# Patient Record
Sex: MALE | Race: WHITE | Employment: FULL TIME | URBAN - METROPOLITAN AREA
[De-identification: names, ages, dates, MRNs, and addresses within clinical notes are randomized per-mention and may not be internally consistent; named-entity substitution may affect disease eponyms.]

---

## 2020-07-24 ENCOUNTER — APPOINTMENT (OUTPATIENT)
Dept: CT IMAGING | Age: 65
End: 2020-07-24
Payer: MEDICARE

## 2020-07-24 ENCOUNTER — HOSPITAL ENCOUNTER (EMERGENCY)
Age: 65
Discharge: HOME | End: 2020-07-24
Payer: MEDICARE

## 2020-07-24 VITALS
BODY MASS INDEX: 28 KG/M2 | OXYGEN SATURATION: 100 % | SYSTOLIC BLOOD PRESSURE: 147 MMHG | HEART RATE: 70 BPM | WEIGHT: 200 LBS | TEMPERATURE: 98.4 F | RESPIRATION RATE: 12 BRPM | DIASTOLIC BLOOD PRESSURE: 79 MMHG | HEIGHT: 71 IN

## 2020-07-24 PROCEDURE — 70450 CT HEAD/BRAIN W/O DYE: CPT

## 2020-07-24 PROCEDURE — 6370000000 HC RX 637 (ALT 250 FOR IP): Performed by: PHYSICIAN ASSISTANT

## 2020-07-24 PROCEDURE — 99284 EMERGENCY DEPT VISIT MOD MDM: CPT

## 2020-07-24 PROCEDURE — 72125 CT NECK SPINE W/O DYE: CPT

## 2020-07-24 RX ORDER — ACETAMINOPHEN 500 MG
1000 TABLET ORAL ONCE
Status: COMPLETED | OUTPATIENT
Start: 2020-07-24 | End: 2020-07-24

## 2020-07-24 RX ADMIN — ACETAMINOPHEN 1000 MG: 500 TABLET ORAL at 23:45

## 2020-07-24 ASSESSMENT — ENCOUNTER SYMPTOMS
VOMITING: 0
ABDOMINAL PAIN: 0
NAUSEA: 0
BACK PAIN: 0
ABDOMINAL DISTENTION: 0

## 2020-07-24 ASSESSMENT — PAIN SCALES - GENERAL
PAINLEVEL_OUTOF10: 3
PAINLEVEL_OUTOF10: 2

## 2020-07-25 ASSESSMENT — VISUAL ACUITY: OU: 1

## 2020-07-25 NOTE — ED TRIAGE NOTES
Pt reports he and his wife were outside working together and went in to the shower together and slipped while they were in the shower. Pt hit head and reports headache. Pt denies any LOC no blood thinners. No nausea.  Pt is A&OX4

## 2020-07-25 NOTE — ED PROVIDER NOTES
stiffness. Negative for back pain. Neurological: Positive for headaches. Negative for dizziness, syncope, facial asymmetry, weakness, light-headedness and numbness. Positives and Pertinent negatives as per HPI. Except as noted abovein the ROS, all other systems were reviewed and negative. PAST MEDICAL HISTORY     Past Medical History:   Diagnosis Date    Clostridium difficile infection 5/18/16, 4/28/16    Diverticulitis     Hyperlipidemia     Reflux     Seasonal allergies          SURGICAL HISTORY     Past Surgical History:   Procedure Laterality Date    APPENDECTOMY      COLONOSCOPY  2005    hx of polyps    COLONOSCOPY  06/28/2016    HERNIA REPAIR      THYROID LOBECTOMY  12/20/12    left and isthmusectomy         CURRENTMEDICATIONS       Previous Medications    ASCORBIC ACID (VITAMIN C) 500 MG TABLET    Take 2,000 mg by mouth daily    AZELASTINE-FLUTICASONE 137-50 MCG/ACT SUSP    1 spray by Nasal route 2 times daily    CETIRIZINE (ZYRTEC) 10 MG TABLET    Take 10 mg by mouth daily. HYDROCODONE-ACETAMINOPHEN (NORCO) 5-325 MG PER TABLET    Take 1-2 tablets by mouth every 4 hours as needed for Pain    MULTIVITAMIN (THERAGRAN) PER TABLET    Take 1 tablet by mouth daily. OMEPRAZOLE (PRILOSEC) 20 MG CAPSULE    Take 20 mg by mouth daily. ONDANSETRON (ZOFRAN ODT) 4 MG DISINTEGRATING TABLET    Take 1 tablet by mouth every 8 hours as needed for Nausea    SIMVASTATIN (ZOCOR) 20 MG TABLET    Take 20 mg by mouth nightly. VITAMIN B-12 (CYANOCOBALAMIN) 1000 MCG TABLET    Take 1,000 mcg by mouth daily    VITAMIN D (CHOLECALCIFEROL) 400 UNITS TABS TABLET    Take 400 Units by mouth daily         ALLERGIES     Patient has no known allergies.     FAMILYHISTORY       Family History   Problem Relation Age of Onset    Diabetes Mother     Heart Disease Father     Diabetes Sister           SOCIAL HISTORY       Social History     Socioeconomic History    Marital status:      Spouse name: Not on file    Number of children: Not on file    Years of education: Not on file    Highest education level: Not on file   Occupational History    Not on file   Social Needs    Financial resource strain: Not on file    Food insecurity     Worry: Not on file     Inability: Not on file    Transportation needs     Medical: Not on file     Non-medical: Not on file   Tobacco Use    Smoking status: Never Smoker    Smokeless tobacco: Never Used   Substance and Sexual Activity    Alcohol use: No    Drug use: No    Sexual activity: Yes     Partners: Female   Lifestyle    Physical activity     Days per week: Not on file     Minutes per session: Not on file    Stress: Not on file   Relationships    Social connections     Talks on phone: Not on file     Gets together: Not on file     Attends Mandaeism service: Not on file     Active member of club or organization: Not on file     Attends meetings of clubs or organizations: Not on file     Relationship status: Not on file    Intimate partner violence     Fear of current or ex partner: Not on file     Emotionally abused: Not on file     Physically abused: Not on file     Forced sexual activity: Not on file   Other Topics Concern    Not on file   Social History Narrative    Not on file       SCREENINGS    [place] Coma Scale  Eye Opening: Spontaneous  Best Verbal Response: Oriented  Best Motor Response: Obeys commands  Aline Coma Scale Score: 15      [place] Criteria for Head CT Imaging  Imaging is indicated if any of the following are present:  GCS < 15 two hours after injury: No  Suspected open/depressed skull fracture: No  Signs of basilar skull fracture: No  Two or more episodes of vomiting since injury: No  Age 72 years or older: Yes  Amnesia of time before impact: No  Dangerous mechanism (pedestrian struck by motor vehicle, ejected from MVC, fall >3ft or >5 steps): No  Neurologic deficits on exam: No  Seizures after injury: No  Bleeding diathesis or anticoagulant use: No    Based on the [facility] HCT rules, head CT imaging is indicated for this patient. NEXUS Criteria for Cervical Spine Imaging  Posterior midline cervical spine tenderness on exam: Yes  Normal level of alertness: Yes  Evidence of intoxication: No  Abnormal neurologic findings on exam: No  Painful distracting injuries: No    Based on the NEXUS criteria, the cervical spine cannot be cleared, and imaging is indicated. PHYSICAL EXAM    (up to 7 for level 4, 8 or more for level 5)     ED Triage Vitals [07/24/20 2138]   BP Temp Temp src Pulse Resp SpO2 Height Weight   (!) 151/98 98.4 °F (36.9 °C) -- 92 20 100 % 5' 10.5\" (1.791 m) 200 lb (90.7 kg)       Physical Exam  Vitals signs and nursing note reviewed. Constitutional:       General: He is awake. He is not in acute distress. Appearance: Normal appearance. He is well-developed. He is not ill-appearing, toxic-appearing or diaphoretic. HENT:      Head: Normocephalic and atraumatic. No raccoon eyes, Sanches's sign, abrasion, contusion, right periorbital erythema or left periorbital erythema. Jaw: There is normal jaw occlusion. Right Ear: Hearing, tympanic membrane, ear canal and external ear normal. No hemotympanum. Left Ear: Hearing, tympanic membrane, ear canal and external ear normal. No hemotympanum. Nose: Nose normal. No nasal deformity, signs of injury or laceration. Right Nostril: No epistaxis or septal hematoma. Left Nostril: No epistaxis or septal hematoma. Mouth/Throat:      Lips: Pink. Mouth: Mucous membranes are moist. No injury. Pharynx: Oropharynx is clear. Uvula midline. Eyes:      General: Lids are normal. Vision grossly intact. Gaze aligned appropriately. No visual field deficit. Right eye: No discharge. Left eye: No discharge. Extraocular Movements: Extraocular movements intact. Right eye: No nystagmus. Left eye: No nystagmus. Conjunctiva/sclera: Conjunctivae normal.   Neck:      Musculoskeletal: Normal range of motion and neck supple. Normal range of motion. Injury, spinous process tenderness and muscular tenderness present. No edema, erythema, neck rigidity, crepitus or pain with movement. Trachea: Trachea and phonation normal.   Cardiovascular:      Rate and Rhythm: Normal rate and regular rhythm. Pulses:           Radial pulses are 2+ on the right side and 2+ on the left side. Posterior tibial pulses are 1+ on the right side and 1+ on the left side. Heart sounds: Normal heart sounds. No murmur. No friction rub. No gallop. Pulmonary:      Effort: Pulmonary effort is normal. No respiratory distress. Breath sounds: Normal breath sounds. No wheezing or rales. Chest:      Chest wall: No mass, lacerations, deformity, swelling, tenderness, crepitus or edema. Abdominal:      General: Abdomen is flat. Bowel sounds are normal. There is no distension or abdominal bruit. There are no signs of injury. Palpations: Abdomen is soft. Tenderness: There is no abdominal tenderness. Musculoskeletal:      Right hip: He exhibits normal strength. Left hip: He exhibits normal strength. Cervical back: He exhibits tenderness, bony tenderness and pain. Thoracic back: Normal.      Lumbar back: He exhibits no tenderness, no bony tenderness, no swelling, no edema, no deformity, no laceration, no pain and no spasm. Comments:  Strength 5/5, sensation intact- Motor strength 5/5 and symmetric to UE Deltoids/trapezius, biceps and wrist extensors. Sensation to light touch intact and symmetric to bilateral UE dermatomes. Strength 5/5 symmetric to LE hip flexors, knees and ankles. Sensation to light touch intact in bilateral LE dermatomes. 2+ DTR patella. Gait normal.      Skin:     General: Skin is warm and dry. Capillary Refill: Capillary refill takes less than 2 seconds.       Coloration: Skin is not pale.      Findings: No abrasion, bruising, ecchymosis, signs of injury or laceration. Neurological:      General: No focal deficit present. Mental Status: He is alert, oriented to person, place, and time and easily aroused. GCS: GCS eye subscore is 4. GCS verbal subscore is 5. GCS motor subscore is 6. Cranial Nerves: Cranial nerves are intact. No facial asymmetry. Sensory: Sensation is intact. Motor: Motor function is intact. No weakness, abnormal muscle tone or pronator drift. Coordination: Coordination is intact. Finger-Nose-Finger Test and Heel to [facility] Test normal. Rapid alternating movements normal.      Gait: Gait is intact. Deep Tendon Reflexes:      Reflex Scores:       Patellar reflexes are 2+ on the right side and 2+ on the left side. Psychiatric:         Attention and Perception: Attention and perception normal.         Behavior: Behavior normal. Behavior is cooperative. Cognition and Memory: Cognition and memory normal.           DIAGNOSTIC RESULTS   LABS:    Labs Reviewed - No data to display    All other labs were within normal range or not returned as of this dictation. EKG: All EKG's are interpreted by the Emergency Department Physician who either signs orCo-signs this chart in the absence of a cardiologist.  Please see their note for interpretation of EKG. RADIOLOGY:   Non-plain film images such as CT, Ultrasound and MRI are read by the radiologist. Plain radiographic images are visualized andpreliminarily interpreted by the  ED Provider with the below findings:        Interpretation perthe Radiologist below, if available at the time of this note:    CT CERVICAL SPINE WO CONTRAST   Final Result   No acute intracranial abnormality. No acute abnormality of the cervical spine. CT HEAD WO CONTRAST   Final Result   No acute intracranial abnormality. No acute abnormality of the cervical spine. No results found. PROCEDURES   Unless otherwise noted below, none     Procedures    CRITICAL CARE TIME   N/A    CONSULTS:  None      EMERGENCY DEPARTMENT COURSE and DIFFERENTIALDIAGNOSIS/MDM:   Vitals:    Vitals:    07/24/20 2138 07/24/20 2247 07/24/20 2348   BP: (!) 151/98 122/75 (!) 147/79   Pulse: 92 78 70   Resp: 20 12 12   Temp: 98.4 °F (36.9 °C)     SpO2: 100% 98% 100%   Weight: 200 lb (90.7 kg)     Height: 5' 10.5\" (1.791 m)         Patient was given thefollowing medications:  Medications   acetaminophen (TYLENOL) tablet 1,000 mg (1,000 mg Oral Given 7/24/20 2345)     Patient seen and evaluated. Old records reviewed. Diagnostic testing reviewed and results discussed. I have independently evaluated this patient based upon my scope of practice. Supervising physician was in the department for consultation as needed. Patient is a 51-year-old male who presents for evaluation after a fall. On exam he is alert oriented afebrile well-perfused hemodynamically stable nontoxic in appearance. He appears well overall, he does have some midline cervical spine tenderness. No other sign of gross trauma. His neuro exam is intact. CT cervical spine is negative for sign of acute abnormality of the spine, CT head is negative for sign of acute intracranial abnormality. Patient given acetaminophen in the emergency department and observed, he remained clinically stable. At this time I believe he is a reasonable candidate for discharge home with close follow-up with PCP and strict return precautions. . Based on patient's clinical history and clinical findings, and absence of peritonitis, sepsis, or toxicity I currently estimate there is low risk for: Intracranial hemorrhage, intra-abdominal injury, perforated bowel, subdural hematoma, AAA, TAA, cauda equina or central cord syndrome, compartment syndrome, epidural mass or lesion, herniated disc causing severe stenosis, tendon or NV injury, fracture or dislocation.  We have discussed the symptoms which are most concerning (e.g., bloody stool, fever, changing or worsening pain, vomiting) that necessitate immediate return. Pt is in agreement with the current plan and all questions were addressed. FINAL IMPRESSION      1. Fall from standing, initial encounter    2. Closed head injury, initial encounter    3. Strain of neck muscle, initial encounter    4.  Cervical disc disease          DISPOSITION/PLAN   DISPOSITION Decision To Discharge 07/24/2020 11:56:04 PM      PATIENT REFERREDTO:  Fabián Guadarrama  [address] [place] [address]  [phone]    Schedule an appointment as soon as possible for a visit   For a recheck in 5-7   days, sooner if no improvement or worsening of symptoms    [place] [facility]  [address] [facility]  Go to   If symptoms worsen      DISCHARGE MEDICATIONS:  New Prescriptions    No medications on file       DISCONTINUED MEDICATIONS:  Discontinued Medications    No medications on file              (Please note that portions ofthis note were completed with a voice recognition program.  Efforts were made to edit the dictations but occasionally words are mis-transcribed.)    Jody Disla (electronically signed)       Jody Disla  07/25/20 9017

## 2020-12-02 ENCOUNTER — HOSPITAL ENCOUNTER (OUTPATIENT)
Age: 65
Discharge: HOME | End: 2020-12-02
Payer: MEDICARE

## 2020-12-02 LAB
INR BLD: 0.92 (ref 0.86–1.14)
PROTHROMBIN TIME: 10.7 SEC (ref 10–13.2)

## 2020-12-02 PROCEDURE — 36415 COLL VENOUS BLD VENIPUNCTURE: CPT

## 2020-12-02 PROCEDURE — 85025 COMPLETE CBC W/AUTO DIFF WBC: CPT

## 2020-12-02 PROCEDURE — 85610 PROTHROMBIN TIME: CPT

## 2020-12-02 PROCEDURE — 82784 ASSAY IGA/IGD/IGG/IGM EACH: CPT

## 2020-12-02 PROCEDURE — 80053 COMPREHEN METABOLIC PANEL: CPT

## 2020-12-02 PROCEDURE — 83690 ASSAY OF LIPASE: CPT

## 2020-12-02 PROCEDURE — 83516 IMMUNOASSAY NONANTIBODY: CPT

## 2020-12-03 ENCOUNTER — HOSPITAL ENCOUNTER (OUTPATIENT)
Age: 65
Discharge: HOME | End: 2020-12-03
Payer: MEDICARE

## 2020-12-03 LAB
A/G RATIO: 2.1 (ref 1.1–2.2)
ALBUMIN SERPL-MCNC: 4.4 G/DL (ref 3.4–5)
ALP BLD-CCNC: 59 U/L (ref 40–129)
ALT SERPL-CCNC: 14 U/L (ref 10–40)
ANION GAP SERPL CALCULATED.3IONS-SCNC: 11 MMOL/L (ref 3–16)
AST SERPL-CCNC: 17 U/L (ref 15–37)
BASOPHILS ABSOLUTE: 0 K/UL (ref 0–0.2)
BASOPHILS RELATIVE PERCENT: 0.6 %
BILIRUB SERPL-MCNC: 0.3 MG/DL (ref 0–1)
BUN BLDV-MCNC: 19 MG/DL (ref 7–20)
C DIFF TOXIN/ANTIGEN: NORMAL
CALCIUM SERPL-MCNC: 8.8 MG/DL (ref 8.3–10.6)
CHLORIDE BLD-SCNC: 106 MMOL/L (ref 99–110)
CO2: 26 MMOL/L (ref 21–32)
CREAT SERPL-MCNC: 1 MG/DL (ref 0.8–1.3)
EOSINOPHILS ABSOLUTE: 0.1 K/UL (ref 0–0.6)
EOSINOPHILS RELATIVE PERCENT: 1.8 %
GFR AFRICAN AMERICAN: >60
GFR NON-AFRICAN AMERICAN: >60
GLOBULIN: 2.1 G/DL
GLUCOSE BLD-MCNC: 86 MG/DL (ref 70–99)
HCT VFR BLD CALC: 37.7 % (ref 40.5–52.5)
HEMOGLOBIN: 13 G/DL (ref 13.5–17.5)
IGA: 131 MG/DL (ref 70–400)
LIPASE: 28 U/L (ref 13–60)
LYMPHOCYTES ABSOLUTE: 2 K/UL (ref 1–5.1)
LYMPHOCYTES RELATIVE PERCENT: 36.4 %
MCH RBC QN AUTO: 30.7 PG (ref 26–34)
MCHC RBC AUTO-ENTMCNC: 34.4 G/DL (ref 31–36)
MCV RBC AUTO: 89.3 FL (ref 80–100)
MONOCYTES ABSOLUTE: 0.6 K/UL (ref 0–1.3)
MONOCYTES RELATIVE PERCENT: 10.3 %
NEUTROPHILS ABSOLUTE: 2.7 K/UL (ref 1.7–7.7)
NEUTROPHILS RELATIVE PERCENT: 50.9 %
PDW BLD-RTO: 13.3 % (ref 12.4–15.4)
PLATELET # BLD: 217 K/UL (ref 135–450)
PMV BLD AUTO: 8.9 FL (ref 5–10.5)
POTASSIUM SERPL-SCNC: 4.5 MMOL/L (ref 3.5–5.1)
RBC # BLD: 4.22 M/UL (ref 4.2–5.9)
SODIUM BLD-SCNC: 143 MMOL/L (ref 136–145)
TISSUE TRANSGLUTAMINASE IGA: <0.5 U/ML (ref 0–14)
TOTAL PROTEIN: 6.5 G/DL (ref 6.4–8.2)
WBC # BLD: 5.4 K/UL (ref 4–11)

## 2020-12-03 PROCEDURE — 87328 CRYPTOSPORIDIUM AG IA: CPT

## 2020-12-03 PROCEDURE — 87336 ENTAMOEB HIST DISPR AG IA: CPT

## 2020-12-03 PROCEDURE — 87506 IADNA-DNA/RNA PROBE TQ 6-11: CPT

## 2020-12-03 PROCEDURE — 83520 IMMUNOASSAY QUANT NOS NONAB: CPT

## 2020-12-03 PROCEDURE — 83993 ASSAY FOR CALPROTECTIN FECAL: CPT

## 2020-12-03 PROCEDURE — 82705 FATS/LIPIDS FECES QUAL: CPT

## 2020-12-03 PROCEDURE — 87324 CLOSTRIDIUM AG IA: CPT

## 2020-12-03 PROCEDURE — 87449 NOS EACH ORGANISM AG IA: CPT

## 2020-12-04 LAB
CRYPTOSPORIDIUM ANTIGEN STOOL: NORMAL
E HISTOLYTICA ANTIGEN STOOL: NORMAL
GIARDIA ANTIGEN STOOL: NORMAL

## 2020-12-06 LAB — PANCREATIC ELASTASE, FECAL: >800 UG/G

## 2020-12-07 LAB
CALPROTECTIN, FECAL: 37 UG/G
FECAL NEUTRAL FAT: NORMAL
FECAL SPLIT FATS: NORMAL

## 2020-12-11 LAB
CAMPYLOBACTER JEJUNI/COLI PCR: NOT DETECTED
CAMPYLOBACTER UPSALIENSIS: NOT DETECTED
E COLI SHIGELLA/ENTEROINVASIVE PCR: NOT DETECTED
SALMONELLA PCR: NOT DETECTED
SHIGA TOXIN I: NOT DETECTED
SHIGA TOXIN II: NOT DETECTED

## 2021-05-20 ENCOUNTER — OFFICE VISIT (OUTPATIENT)
Dept: ORTHOPEDIC SURGERY | Age: 66
End: 2021-05-20
Payer: MEDICARE

## 2021-05-20 VITALS — WEIGHT: 200 LBS | BODY MASS INDEX: 28 KG/M2 | HEIGHT: 71 IN

## 2021-05-20 DIAGNOSIS — M25.561 ACUTE PAIN OF RIGHT KNEE: Primary | ICD-10-CM

## 2021-05-20 DIAGNOSIS — M17.11 PRIMARY OSTEOARTHRITIS OF RIGHT KNEE: ICD-10-CM

## 2021-05-20 PROCEDURE — 99202 OFFICE O/P NEW SF 15 MIN: CPT | Performed by: NURSE PRACTITIONER

## 2021-05-20 PROCEDURE — 20610 DRAIN/INJ JOINT/BURSA W/O US: CPT | Performed by: NURSE PRACTITIONER

## 2021-05-20 RX ORDER — FLUTICASONE PROPIONATE 50 MCG
1 SPRAY, SUSPENSION (ML) NASAL 2 TIMES DAILY
Status: ON HOLD | COMMUNITY
End: 2021-12-27

## 2021-05-20 RX ORDER — AMLODIPINE BESYLATE 5 MG/1
5 TABLET ORAL DAILY
COMMUNITY
Start: 2021-03-26

## 2021-05-20 RX ORDER — MONTELUKAST SODIUM 10 MG/1
10 TABLET ORAL NIGHTLY
COMMUNITY
Start: 2021-03-26

## 2021-05-20 RX ORDER — METHYLPREDNISOLONE ACETATE 40 MG/ML
40 INJECTION, SUSPENSION INTRA-ARTICULAR; INTRALESIONAL; INTRAMUSCULAR; SOFT TISSUE ONCE
Status: COMPLETED | OUTPATIENT
Start: 2021-05-20 | End: 2021-05-20

## 2021-05-20 RX ORDER — AZELASTINE 1 MG/ML
SPRAY, METERED NASAL
COMMUNITY
Start: 2020-12-26 | End: 2021-06-03 | Stop reason: ALTCHOICE

## 2021-05-20 RX ORDER — MELOXICAM 15 MG/1
TABLET ORAL
Status: ON HOLD | COMMUNITY
Start: 2021-03-26 | End: 2021-12-27

## 2021-05-20 RX ORDER — PHENAZOPYRIDINE HYDROCHLORIDE 200 MG/1
TABLET, FILM COATED ORAL
COMMUNITY
Start: 2020-11-12 | End: 2021-06-03 | Stop reason: ALTCHOICE

## 2021-05-20 RX ORDER — LISINOPRIL 20 MG/1
20 TABLET ORAL DAILY
COMMUNITY

## 2021-05-20 RX ORDER — BUPIVACAINE HYDROCHLORIDE 2.5 MG/ML
12 INJECTION, SOLUTION INFILTRATION; PERINEURAL ONCE
Status: COMPLETED | OUTPATIENT
Start: 2021-05-20 | End: 2021-05-20

## 2021-05-20 RX ADMIN — BUPIVACAINE HYDROCHLORIDE 30 MG: 2.5 INJECTION, SOLUTION INFILTRATION; PERINEURAL at 11:42

## 2021-05-20 RX ADMIN — METHYLPREDNISOLONE ACETATE 40 MG: 40 INJECTION, SUSPENSION INTRA-ARTICULAR; INTRALESIONAL; INTRAMUSCULAR; SOFT TISSUE at 11:43

## 2021-05-20 NOTE — PROGRESS NOTES
Subjective    Patient ID: Reid Bond is a 77 y.o..  male. Chief Complaint   Patient presents with    Knee Pain     right knee pain,        Pain Assessment  Location of Pain: Knee  Location Modifiers: Right  Severity of Pain: 2  Quality of Pain: Dull  Duration of Pain: A few hours  Frequency of Pain: Intermittent  Aggravating Factors: Stairs, Exercise, Standing, Walking, Bending  Limiting Behavior: No  Result of Injury: No  Work-Related Injury: No  Are there other pain locations you wish to document?: No    Knee Pain  Patient complains of right knee pain. She reports that he has had this knee pain on and off for the past 3 years. However couple weeks ago he moved into a new home and this increased his pain. He points to the global knee as a source of his pain. He reports that walking long distances and going up and down the steps is the worst.  He did receive a cortisone injection in 2019 at [facility] since he which did help. He denies any specific mechanism of injury, does have popping at times. He currently works as a . Patient's medications, allergies, past medical, surgical, social and family histories were reviewed and updated as appropriate. Physical Exam:   Constitutional:  Pt well groomed, no acute distress, well developed, no obvious deformities  Vitals:    05/20/21 1046   Weight: 200 lb (90.7 kg)   Height: 5' 10.5\" (1.791 m)     -Oriented to person, place, and time  -mood and affect are appropriate    Knee exam - right knee exam shows;    -range of motion of R. Knee is 0 to 120, and L. Knee is 0 to 120. The patient does have  pain on motion  -there is not an effusion  - there is tenderness over the  lateral, suprapatellar region  -there are not any masses  -there is not  deformity noted.    Gabriella Adams testing painful  -Grinding with range of motion palpated    Contralateral Exam:  -No obvious deformities  -No abrasions or cellulitis noted, NVI   -Full ROM   -No joint laxity

## 2021-05-27 ENCOUNTER — TELEPHONE (OUTPATIENT)
Dept: ORTHOPEDIC SURGERY | Age: 66
End: 2021-05-27

## 2021-05-27 NOTE — TELEPHONE ENCOUNTER
05/27/2021  EUFLEXXA  (SERIES OF 3) RIGHT  KNEE. Ritika 59 #   [id number]. DATES:   06/20/2021 - 0819/2021. PER FAX FROM  [facility] MEDICARE. AP.      *APPEARS THEY HAVE APPROVED THE START DATE 30 DAYS FROM THE LAST CORTISONE INJECTION ON 5/290/21.

## 2021-06-03 ENCOUNTER — TELEPHONE (OUTPATIENT)
Dept: ORTHOPEDIC SURGERY | Age: 66
End: 2021-06-03

## 2021-06-03 RX ORDER — FAMOTIDINE 20 MG/1
20 TABLET, FILM COATED ORAL DAILY
COMMUNITY

## 2021-06-03 RX ORDER — CHLORAL HYDRATE 500 MG
1000 CAPSULE ORAL DAILY
COMMUNITY

## 2021-06-03 NOTE — PROGRESS NOTES
PRE OP INSTRUCTION SHEET   1. Do not eat or drink anything after 12 midnight  prior to surgery. This includes no water, chewing gum or mints. 2. Take the following pills will a small sip of water (see MAR)                                        3. Aspirin, Ibuprofen, Advil, Naproxen, Vitamin E, fish oil and other Anti-inflammatory products should be stopped for 5 days before surgery or as directed by your physician. 4. Check with your Doctor regarding stopping Plavix, Coumadin, Lovenox, Fragmin or other blood thinners   5. Do not smoke, and do not drink any alcoholic beverages 24 hours prior to surgery. This includes NA Beer. 6. You may brush your teeth and gargle the morning of surgery. DO NOT SWALLOW WATER   7. You MUST make arrangements for a responsible adult to take you home after your surgery. You will not be allowed to leave alone or drive yourself home. It is strongly suggested someone stay with you the first 24 hrs. Your surgery will be cancelled if you do not have a ride home. 8. A parent/legal guardian must accompany a child scheduled for surgery and plan to stay at the hospital until the child is discharged. Please do not bring other children with you. 9. Please wear simple, loose fitting clothing to the hospital.  Carlos Kenton not bring valuables (money, credit cards, checkbooks, etc.) Do not wear any makeup (including no eye makeup) or nail polish on your fingers or toes. 10. DO NOT wear any jewelry or piercings on day of surgery. All body piercing jewelry must be removed. 11. If you have dentures,glasses, or contacts they will be removed before going to the OR; we will provide you a container. 12. Please see your family doctor/and cardiologist for a history & physical and/or concerning medications. Bring any test results/reports from your physician's office. Have history and labs faxed to [phone].  Remember to bring Blood Bank bracelet on the day of surgery. 14. If you have a Living Will and Durable Power of  for Healthcare, please bring in a copy. 13. Notify your Surgeon if you develop any illness between now and surgery  time, cough, cold, fever, sore throat, nausea, vomiting, etc.  Please notify your surgeon if you experience dizziness, shortness of breath or blurred vision between now & the time of your surgery   16. DO NOT shave your operative site 96 hours prior to surgery. For face & neck surgery, men may use an electric razor 48 hours prior to surgery. 17. Shower with _x__Antibacterial soap (x_chlorhexidine for total joint  Pt's) shower two times before surgery.(the morning of and the night before. 18. To provide excellent care visitors will be limited to one in the room at any given time.   Please call pre admission testing if you any further questions [phone] or [phone]

## 2021-06-09 ENCOUNTER — ANESTHESIA (OUTPATIENT)
Dept: OPERATING ROOM | Age: 66
End: 2021-06-09
Payer: MEDICARE

## 2021-06-09 ENCOUNTER — HOSPITAL ENCOUNTER (OUTPATIENT)
Age: 66
Setting detail: OUTPATIENT SURGERY
Discharge: HOME | End: 2021-06-09
Attending: UROLOGY | Admitting: UROLOGY
Payer: MEDICARE

## 2021-06-09 ENCOUNTER — ANESTHESIA EVENT (OUTPATIENT)
Dept: OPERATING ROOM | Age: 66
End: 2021-06-09
Payer: MEDICARE

## 2021-06-09 VITALS — OXYGEN SATURATION: 99 % | SYSTOLIC BLOOD PRESSURE: 128 MMHG | DIASTOLIC BLOOD PRESSURE: 70 MMHG

## 2021-06-09 VITALS
DIASTOLIC BLOOD PRESSURE: 77 MMHG | HEIGHT: 70 IN | RESPIRATION RATE: 12 BRPM | WEIGHT: 215 LBS | OXYGEN SATURATION: 97 % | TEMPERATURE: 97.8 F | BODY MASS INDEX: 30.78 KG/M2 | SYSTOLIC BLOOD PRESSURE: 126 MMHG | HEART RATE: 72 BPM

## 2021-06-09 PROCEDURE — 2709999900 HC NON-CHARGEABLE SUPPLY: Performed by: UROLOGY

## 2021-06-09 PROCEDURE — 6360000002 HC RX W HCPCS: Performed by: UROLOGY

## 2021-06-09 PROCEDURE — 6360000002 HC RX W HCPCS: Performed by: NURSE ANESTHETIST, CERTIFIED REGISTERED

## 2021-06-09 PROCEDURE — 2500000003 HC RX 250 WO HCPCS: Performed by: ANESTHESIOLOGY

## 2021-06-09 PROCEDURE — 3600000004 HC SURGERY LEVEL 4 BASE: Performed by: UROLOGY

## 2021-06-09 PROCEDURE — 2580000003 HC RX 258: Performed by: UROLOGY

## 2021-06-09 PROCEDURE — 3700000000 HC ANESTHESIA ATTENDED CARE: Performed by: UROLOGY

## 2021-06-09 PROCEDURE — 6370000000 HC RX 637 (ALT 250 FOR IP): Performed by: UROLOGY

## 2021-06-09 PROCEDURE — 7100000011 HC PHASE II RECOVERY - ADDTL 15 MIN: Performed by: UROLOGY

## 2021-06-09 PROCEDURE — 2580000003 HC RX 258: Performed by: ANESTHESIOLOGY

## 2021-06-09 PROCEDURE — 7100000010 HC PHASE II RECOVERY - FIRST 15 MIN: Performed by: UROLOGY

## 2021-06-09 PROCEDURE — 2500000003 HC RX 250 WO HCPCS: Performed by: NURSE ANESTHETIST, CERTIFIED REGISTERED

## 2021-06-09 RX ORDER — OXYCODONE HYDROCHLORIDE AND ACETAMINOPHEN 5; 325 MG/1; MG/1
1 TABLET ORAL PRN
Status: DISCONTINUED | OUTPATIENT
Start: 2021-06-09 | End: 2021-06-09 | Stop reason: HOSPADM

## 2021-06-09 RX ORDER — OXYCODONE HYDROCHLORIDE AND ACETAMINOPHEN 5; 325 MG/1; MG/1
2 TABLET ORAL PRN
Status: DISCONTINUED | OUTPATIENT
Start: 2021-06-09 | End: 2021-06-09 | Stop reason: HOSPADM

## 2021-06-09 RX ORDER — SODIUM CHLORIDE 0.9 % (FLUSH) 0.9 %
5-40 SYRINGE (ML) INJECTION PRN
Status: DISCONTINUED | OUTPATIENT
Start: 2021-06-09 | End: 2021-06-09 | Stop reason: HOSPADM

## 2021-06-09 RX ORDER — PHENAZOPYRIDINE HYDROCHLORIDE 200 MG/1
200 TABLET, FILM COATED ORAL 3 TIMES DAILY PRN
Qty: 15 TABLET | Refills: 0 | Status: SHIPPED | OUTPATIENT
Start: 2021-06-09 | End: 2021-06-14

## 2021-06-09 RX ORDER — LABETALOL HYDROCHLORIDE 5 MG/ML
5 INJECTION, SOLUTION INTRAVENOUS
Status: DISCONTINUED | OUTPATIENT
Start: 2021-06-09 | End: 2021-06-09 | Stop reason: HOSPADM

## 2021-06-09 RX ORDER — SODIUM CHLORIDE, SODIUM LACTATE, POTASSIUM CHLORIDE, CALCIUM CHLORIDE 600; 310; 30; 20 MG/100ML; MG/100ML; MG/100ML; MG/100ML
INJECTION, SOLUTION INTRAVENOUS CONTINUOUS
Status: DISCONTINUED | OUTPATIENT
Start: 2021-06-09 | End: 2021-06-09 | Stop reason: HOSPADM

## 2021-06-09 RX ORDER — MEPERIDINE HYDROCHLORIDE 25 MG/ML
12.5 INJECTION INTRAMUSCULAR; INTRAVENOUS; SUBCUTANEOUS EVERY 5 MIN PRN
Status: DISCONTINUED | OUTPATIENT
Start: 2021-06-09 | End: 2021-06-09 | Stop reason: HOSPADM

## 2021-06-09 RX ORDER — SULFAMETHOXAZOLE AND TRIMETHOPRIM 400; 80 MG/1; MG/1
1 TABLET ORAL 2 TIMES DAILY
Qty: 8 TABLET | Refills: 0 | Status: SHIPPED | OUTPATIENT
Start: 2021-06-09 | End: 2021-06-13

## 2021-06-09 RX ORDER — SODIUM CHLORIDE 9 MG/ML
25 INJECTION, SOLUTION INTRAVENOUS PRN
Status: DISCONTINUED | OUTPATIENT
Start: 2021-06-09 | End: 2021-06-09 | Stop reason: HOSPADM

## 2021-06-09 RX ORDER — HYDRALAZINE HYDROCHLORIDE 20 MG/ML
5 INJECTION INTRAMUSCULAR; INTRAVENOUS EVERY 30 MIN PRN
Status: DISCONTINUED | OUTPATIENT
Start: 2021-06-09 | End: 2021-06-09 | Stop reason: HOSPADM

## 2021-06-09 RX ORDER — DIPHENHYDRAMINE HYDROCHLORIDE 50 MG/ML
6.25 INJECTION INTRAMUSCULAR; INTRAVENOUS
Status: DISCONTINUED | OUTPATIENT
Start: 2021-06-09 | End: 2021-06-09 | Stop reason: HOSPADM

## 2021-06-09 RX ORDER — MAGNESIUM HYDROXIDE 1200 MG/15ML
LIQUID ORAL PRN
Status: DISCONTINUED | OUTPATIENT
Start: 2021-06-09 | End: 2021-06-09 | Stop reason: ALTCHOICE

## 2021-06-09 RX ORDER — LIDOCAINE HYDROCHLORIDE 10 MG/ML
0.3 INJECTION, SOLUTION EPIDURAL; INFILTRATION; INTRACAUDAL; PERINEURAL
Status: COMPLETED | OUTPATIENT
Start: 2021-06-09 | End: 2021-06-09

## 2021-06-09 RX ORDER — LIDOCAINE HYDROCHLORIDE 20 MG/ML
INJECTION, SOLUTION INFILTRATION; PERINEURAL PRN
Status: DISCONTINUED | OUTPATIENT
Start: 2021-06-09 | End: 2021-06-09 | Stop reason: SDUPTHER

## 2021-06-09 RX ORDER — PROPOFOL 10 MG/ML
INJECTION, EMULSION INTRAVENOUS PRN
Status: DISCONTINUED | OUTPATIENT
Start: 2021-06-09 | End: 2021-06-09 | Stop reason: SDUPTHER

## 2021-06-09 RX ORDER — ONDANSETRON 2 MG/ML
4 INJECTION INTRAMUSCULAR; INTRAVENOUS EVERY 30 MIN PRN
Status: DISCONTINUED | OUTPATIENT
Start: 2021-06-09 | End: 2021-06-09 | Stop reason: HOSPADM

## 2021-06-09 RX ORDER — LIDOCAINE HYDROCHLORIDE 20 MG/ML
JELLY TOPICAL PRN
Status: DISCONTINUED | OUTPATIENT
Start: 2021-06-09 | End: 2021-06-09 | Stop reason: ALTCHOICE

## 2021-06-09 RX ORDER — SODIUM CHLORIDE 0.9 % (FLUSH) 0.9 %
5-40 SYRINGE (ML) INJECTION EVERY 12 HOURS SCHEDULED
Status: DISCONTINUED | OUTPATIENT
Start: 2021-06-09 | End: 2021-06-09 | Stop reason: HOSPADM

## 2021-06-09 RX ADMIN — PROPOFOL 80 MG: 10 INJECTION, EMULSION INTRAVENOUS at 13:38

## 2021-06-09 RX ADMIN — PROPOFOL 50 MG: 10 INJECTION, EMULSION INTRAVENOUS at 13:44

## 2021-06-09 RX ADMIN — Medication 2000 MG: at 13:30

## 2021-06-09 RX ADMIN — SODIUM CHLORIDE, POTASSIUM CHLORIDE, SODIUM LACTATE AND CALCIUM CHLORIDE: 600; 310; 30; 20 INJECTION, SOLUTION INTRAVENOUS at 12:06

## 2021-06-09 RX ADMIN — LIDOCAINE HYDROCHLORIDE 100 MG: 20 INJECTION, SOLUTION INFILTRATION; PERINEURAL at 13:38

## 2021-06-09 RX ADMIN — PROPOFOL 20 MG: 10 INJECTION, EMULSION INTRAVENOUS at 13:40

## 2021-06-09 RX ADMIN — LIDOCAINE HYDROCHLORIDE 0.3 ML: 10 INJECTION, SOLUTION EPIDURAL; INFILTRATION; INTRACAUDAL; PERINEURAL at 12:07

## 2021-06-09 ASSESSMENT — PULMONARY FUNCTION TESTS
PIF_VALUE: 0

## 2021-06-09 NOTE — ANESTHESIA PRE PROCEDURE
Department of Anesthesiology  Preprocedure Note       Name:  Teddy Sky   Age:  77 y.o.  :  1955                                          MRN:  [id number]         Date:  2021      Surgeon: Ba Becker):  Vipul Chicas MD    Procedure: Procedure(s):  CYSTOSCOPY, POSSIBLE URETHRAL DILATATION, POSSIBLE BLADDER BIOPSY    Medications prior to admission:   Prior to Admission medications    Medication Sig Start Date End Date Taking? Authorizing Provider   famotidine (PEPCID) 20 MG tablet Take 20 mg by mouth daily   Yes Historical Provider, MD   Omega-3 Fatty Acids (FISH OIL) 1000 MG CAPS Take 1,000 mg by mouth daily   Yes Historical Provider, MD   meloxicam (MOBIC) 15 MG tablet  3/26/21  Yes Historical Provider, MD   montelukast (SINGULAIR) 10 MG tablet Take 10 mg by mouth nightly  3/26/21  Yes Historical Provider, MD   lisinopril (PRINIVIL;ZESTRIL) 20 MG tablet Take 20 mg by mouth daily   Yes Historical Provider, MD   amLODIPine (NORVASC) 5 MG tablet Take 5 mg by mouth daily  3/26/21  Yes Historical Provider, MD   MAGNESIUM PO Take by mouth nightly   Yes Historical Provider, MD   Azelastine-Fluticasone 137-50 MCG/ACT SUSP 1 spray by Nasal route 2 times daily   Yes Historical Provider, MD   simvastatin (ZOCOR) 20 MG tablet Take 20 mg by mouth nightly. Yes Historical Provider, MD   cetirizine (ZYRTEC) 10 MG tablet Take 10 mg by mouth daily. Yes Historical Provider, MD   multivitamin [facility]) per tablet Take 1 tablet by mouth daily.      Yes Historical Provider, MD   fluticasone (FLONASE) 50 MCG/ACT nasal spray 1 spray by Nasal route 2 times daily    Historical Provider, MD       Current medications:    Current Facility-Administered Medications   Medication Dose Route Frequency Provider Last Rate Last Admin    ceFAZolin (ANCEF) 2000 mg in sterile water 20 mL IV syringe  2,000 mg Intravenous Once Vipul Chicas MD        lactated ringers infusion   Intravenous Continuous Willeen Conception MD Ira 125 mL/hr at 06/09/21 1206 New Bag at 06/09/21 1206    sodium chloride flush 0.9 % injection 5-40 mL  5-40 mL Intravenous 2 times per day Sharri Alvares MD        sodium chloride flush 0.9 % injection 5-40 mL  5-40 mL Intravenous PRN Sharri Alvares MD        0.9 % sodium chloride infusion  25 mL Intravenous PRN Sharri Alvares MD        meperidine (DEMEROL) injection 12.5 mg  12.5 mg Intravenous Q5 Min PRN Alex Walsh MD        HYDROmorphone (DILAUDID) injection 0.5 mg  0.5 mg Intravenous Q10 Min PRN Alex Walsh MD        HYDROmorphone (DILAUDID) injection 0.5 mg  0.5 mg Intravenous Q5 Min PRN Alex Walsh MD        oxyCODONE-acetaminophen (PERCOCET) 5-325 MG per tablet 1 tablet  1 tablet Oral PRN Alex Walsh MD        Or    oxyCODONE-acetaminophen (PERCOCET) 5-325 MG per tablet 2 tablet  2 tablet Oral PRN Alex Walsh MD        ondansetron [facility]) injection 4 mg  4 mg Intravenous Q30 Min PRN Alex Walsh MD        diphenhydrAMINE (BENADRYL) injection 6.25 mg  6.25 mg Intravenous Once PRN Alex Walsh MD        labetalol (NORMODYNE;TRANDATE) injection 5 mg  5 mg Intravenous Q15 Min PRN Alex Walsh MD        hydrALAZINE (APRESOLINE) injection 5 mg  5 mg Intravenous Q30 Min PRN Alex Walsh MD           Allergies:  No Known Allergies    Problem List:    Patient Active Problem List   Diagnosis Code    C. difficile colitis A04.72    Hyperlipidemia E78.5    Reflux [id number]       Past Medical History:        Diagnosis Date    Arthritis     BPH (benign prostatic hyperplasia)     Clostridium difficile infection 5/18/16, 4/28/16    Diverticulitis     Hyperlipidemia     Hypertension     Reflux     Seasonal allergies        Past Surgical History:        Procedure Laterality Date    APPENDECTOMY      COLONOSCOPY  2005    hx of polyps    COLONOSCOPY  06/28/2016    HERNIA REPAIR      THYROID LOBECTOMY 12/20/12    left and isthmusectomy       Social History:    Social History     Tobacco Use    Smoking status: Never Smoker    Smokeless tobacco: Never Used   Substance Use Topics    Alcohol use: No                                Counseling given: Not Answered      Vital Signs (Current):   Vitals:    06/03/21 1447 06/09/21 1204   BP:  (!) 158/79   Pulse:  77   Resp:  14   Temp:  97.5 °F (36.4 °C)   TempSrc:  Infrared   SpO2:  96%   Weight: 215 lb (97.5 kg) 215 lb (97.5 kg)   Height: 5' 10\" (1.778 m) 5' 10\" (1.778 m)                                              BP Readings from Last 3 Encounters:   06/09/21 (!) 158/79   07/24/20 (!) 147/79   06/28/16 104/71       NPO Status: Time of last liquid consumption: 2000                        Time of last solid consumption: 2000                        Date of last liquid consumption: 06/08/21                        Date of last solid food consumption: 06/08/21    BMI:   Wt Readings from Last 3 Encounters:   06/09/21 215 lb (97.5 kg)   05/20/21 200 lb (90.7 kg)   07/24/20 200 lb (90.7 kg)     Body mass index is 30.85 kg/m². CBC:   Lab Results   Component Value Date    WBC 5.4 12/02/2020    RBC 4.22 12/02/2020    HGB 13.0 12/02/2020    HCT 37.7 12/02/2020    MCV 89.3 12/02/2020    RDW 13.3 12/02/2020     12/02/2020       CMP:   Lab Results   Component Value Date     12/02/2020    K 4.5 12/02/2020     12/02/2020    CO2 26 12/02/2020    BUN 19 12/02/2020    CREATININE 1.0 12/02/2020    GFRAA >60 12/02/2020    AGRATIO 2.1 12/02/2020    LABGLOM >60 12/02/2020    GLUCOSE 86 12/02/2020    PROT 6.5 12/02/2020    CALCIUM 8.8 12/02/2020    BILITOT 0.3 12/02/2020    ALKPHOS 59 12/02/2020    AST 17 12/02/2020    ALT 14 12/02/2020       POC Tests: No results for input(s): POCGLU, POCNA, POCK, POCCL, POCBUN, POCHEMO, POCHCT in the last 72 hours.     Coags:   Lab Results   Component Value Date    PROTIME 10.7 12/02/2020    INR 0.92 12/02/2020       HCG (If Applicable): No results found for: PREGTESTUR, PREGSERUM, HCG, HCGQUANT     ABGs: No results found for: PHART, PO2ART, FLC0NCB, EEE8FEG, BEART, Q0NSELBH     Type & Screen (If Applicable):  No results found for: LABABO, LABRH    Drug/Infectious Status (If Applicable):  No results found for: HIV, HEPCAB    COVID-19 Screening (If Applicable): No results found for: COVID19        Anesthesia Evaluation  Patient summary reviewed and Nursing notes reviewed no history of anesthetic complications:   Airway: Mallampati: II     Neck ROM: full   Dental:          Pulmonary:                              Cardiovascular:    (+) hypertension:,                   Neuro/Psych:               GI/Hepatic/Renal:             Endo/Other:                     Abdominal:           Vascular:                                        Anesthesia Plan      general     ASA 2     (Medications & allergies reviewed  All available lab & EKG data reviewed)  Induction: intravenous. Anesthetic plan and risks discussed with patient. Plan discussed with CRNA.                   Margie Vazquez MD   6/9/2021

## 2021-06-09 NOTE — PROGRESS NOTES
Preparing for Discharge. Pt awake and up to bathroom to void. Little burning with urination. Discharge instructions reviewed with wife. Call to Dr Mary Lou Araya To speak with the pt wife.

## 2021-06-09 NOTE — BRIEF OP NOTE
Brief Postoperative Note      Patient: Jarrett Tabor  YOB: 1955  MRN: [id number]    Date of Procedure: 6/9/2021    Pre-Op Diagnosis: BENIGN PROSTATIC HYPERTROPHY    Post-Op Diagnosis: Same       Procedure(s):  CYSTOSCOPY, URETHRAL DILATATION    Surgeon(s):  Yared Moore MD    Assistant:  * No surgical staff found *    Anesthesia: General    Estimated Blood Loss (mL): Minimal    Complications: None    Specimens:   * No specimens in log *    Implants:  * No implants in log *      Drains: * No LDAs found *    Findings: Bilobar hyperplasia, moderate bladder neck, no median lobe, no TCC or stones.     Electronically signed by Evangelist Roque MD on 6/9/2021 at 1:46 PM

## 2021-06-09 NOTE — ANESTHESIA POSTPROCEDURE EVALUATION
Department of Anesthesiology  Postprocedure Note    Patient: Gerald Lawler  MRN: [id number]  YOB: 1955  Date of evaluation: 6/9/2021  Time:  4:04 PM     Procedure Summary     Date: 06/09/21 Room / Location: [facility]    Anesthesia Start: 1256 Anesthesia Stop: 0987    Procedure: CYSTOSCOPY, URETHRAL DILATATION (N/A Bladder) Diagnosis: (BENIGN PROSTATIC HYPERTROPHY)    Surgeons: León Adamson MD Responsible Provider: Rosio Green MD    Anesthesia Type: general ASA Status: 2          Anesthesia Type: general    Guerita Phase I: Guerita Score: 10    Guerita Phase II: Guerita Score: 10    Last vitals: Reviewed and per EMR flowsheets.        Anesthesia Post Evaluation    Comments: Postoperative Anesthesia Note    Name:    Gerald Lawler  MRN:      [id number]    Patient Vitals in the past 12 hrs:  06/09/21 1415, BP:126/77, Temp:97.8 °F (36.6 °C), Pulse:72, Resp:12, SpO2:97 %  06/09/21 1414, Pulse:70  06/09/21 1400, BP:109/68, Pulse:71, Resp:12, SpO2:97 %  06/09/21 1355, BP:111/69, Pulse:71, Resp:12, SpO2:96 %  06/09/21 1350, BP:111/69, Temp:97.9 °F (36.6 °C), Pulse:71, Resp:12, SpO2:96 %  06/09/21 1204, BP:(!) 158/79, Temp:97.5 °F (36.4 °C), Temp src:Infrared, Pulse:77, Resp:14, SpO2:96 %, Height:5' 10\" (1.778 m), Weight:215 lb (97.5 kg)     LABS:    CBC  Lab Results       Component                Value               Date/Time                  WBC                      5.4                 12/02/2020 05:11 PM        HGB                      13.0 (L)            12/02/2020 05:11 PM        HCT                      37.7 (L)            12/02/2020 05:11 PM        PLT                      217                 12/02/2020 05:11 PM   RENAL  Lab Results       Component                Value               Date/Time                  NA                       143                 12/02/2020 05:11 PM        K                        4.5                 12/02/2020 05:11 PM        CL 106                 12/02/2020 05:11 PM        CO2                      26                  12/02/2020 05:11 PM        BUN                      19                  12/02/2020 05:11 PM        CREATININE               1.0                 12/02/2020 05:11 PM        GLUCOSE                  86                  12/02/2020 05:11 PM   COAGS  Lab Results       Component                Value               Date/Time                  PROTIME                  10.7                12/02/2020 05:12 PM        INR                      0.92                12/02/2020 05:12 PM     Intake & Output: In: 720 (P.O.:20; I.V.:700)  Out: -     Nausea & Vomiting:  No    Level of Consciousness:  Awake    Pain Assessment:  Adequate analgesia    Anesthesia Complications:  No apparent anesthetic complications    SUMMARY      Vital signs stable  OK to discharge from Stage I post anesthesia care.   Care transferred from Anesthesiology department on discharge from perioperative area

## 2021-06-21 NOTE — OP NOTE
lobe.  Upon entering the bladder,  diffuse trabeculation was seen consistent with outlet obstruction. I  did not find any evidence for any stones or tumors and a biopsy did not  need to be performed at this time. Dilation of the prostatic fossa and  urethra was then done with Pastor Sables sounds to 28-Tajik. His bladder  was drained and lidocaine gel was applied, and he was taken back to the  postop recovery room in stable condition.         Mandy Calderón MD    D: 06/21/2021 7:24:52       T: 06/21/2021 8:22:18     [email or web address]  Job#: [id number]     Doc#: [id number]    CC:

## 2021-06-21 NOTE — H&P
Dr. Suni Azar [facility] Day Surgery H&P     6/21/2021  Kayla Roses    Reason for Surgery:  BPH  Requesting Physician:  ER/Galdino Briceno      History Obtained From:  patient, electronic medical record    HISTORY OF PRESENT ILLNESS:                The patient is a 77 y.o. male who presents with LUTs. I am taking the patient to surgery for evaluation and care of this problem. Past Medical History:        Diagnosis Date    Arthritis     BPH (benign prostatic hyperplasia)     Clostridium difficile infection 5/18/16, 4/28/16    Diverticulitis     Hyperlipidemia     Hypertension     Reflux     Seasonal allergies      Past Surgical History:        Procedure Laterality Date    APPENDECTOMY      COLONOSCOPY  2005    hx of polyps    COLONOSCOPY  06/28/2016    CYSTOSCOPY N/A 6/9/2021    CYSTOSCOPY, URETHRAL DILATATION performed by Yemi Delgado MD at [facility]  12/20/12    left and isthmusectomy     Current Medications:   Prior to Admission medications    Medication Sig Start Date End Date Taking? Authorizing Provider   famotidine (PEPCID) 20 MG tablet Take 20 mg by mouth daily   Yes Historical Provider, MD   Omega-3 Fatty Acids (FISH OIL) 1000 MG CAPS Take 1,000 mg by mouth daily   Yes Historical Provider, MD   meloxicam (MOBIC) 15 MG tablet  3/26/21  Yes Historical Provider, MD   montelukast (SINGULAIR) 10 MG tablet Take 10 mg by mouth nightly  3/26/21  Yes Historical Provider, MD   lisinopril (PRINIVIL;ZESTRIL) 20 MG tablet Take 20 mg by mouth daily   Yes Historical Provider, MD   amLODIPine (NORVASC) 5 MG tablet Take 5 mg by mouth daily  3/26/21  Yes Historical Provider, MD   MAGNESIUM PO Take by mouth nightly   Yes Historical Provider, MD   Azelastine-Fluticasone 137-50 MCG/ACT SUSP 1 spray by Nasal route 2 times daily   Yes Historical Provider, MD   simvastatin (ZOCOR) 20 MG tablet Take 20 mg by mouth nightly.      Yes Historical Provider, MD   cetirizine (ZYRTEC) 10 MG tablet Take 10 mg by mouth daily. Yes Historical Provider, MD   multivitamin [facility]) per tablet Take 1 tablet by mouth daily. Yes Historical Provider, MD   fluticasone (FLONASE) 50 MCG/ACT nasal spray 1 spray by Nasal route 2 times daily    Historical Provider, MD     Allergies:  No Known Allergies  Social History:  Reviewed, non contributory    Family History:  Reviewed, non contributory      REVIEW OF SYSTEMS:    12 point ROS was already completed and this has been reviewed. The pertinent positive findings include LUTs. PHYSICAL EXAM:    VITALS:  /77   Pulse 72   Temp 97.8 °F (36.6 °C)   Resp 12   Ht 5' 10\" (1.778 m)   Wt 215 lb (97.5 kg)   SpO2 97%   BMI 30.85 kg/m²   H&N: Sclera normal, no masses, trachea midline, no bruit  CVS: Normal rate and rhythm, no murmurs or rubs, peripheral pulses equal, no clubbing or cyanosis. RESP: Breath sounds equal bilateral, few rhonchi. ABDO: Soft, non-tender, bowel sounds active, no organomegaly, no hernias. LYMPH:  No lymphadenopathy. Skin: Warm dry and intact. : No CVAT, normal external genitalia, no discharge. MSK: Grossly normal for patient  ROSI: Grossly normal for patient  PSY: No acute changes noted in psychosocial assessment.      DATA:    CBC:   Lab Results   Component Value Date    WBC 5.4 12/02/2020    RBC 4.22 12/02/2020    HGB 13.0 12/02/2020    HCT 37.7 12/02/2020    MCV 89.3 12/02/2020    MCH 30.7 12/02/2020    MCHC 34.4 12/02/2020    RDW 13.3 12/02/2020     12/02/2020    MPV 8.9 12/02/2020     BMP:    Lab Results   Component Value Date     12/02/2020    K 4.5 12/02/2020     12/02/2020    CO2 26 12/02/2020    BUN 19 12/02/2020    LABALBU 4.4 12/02/2020    CREATININE 1.0 12/02/2020    CALCIUM 8.8 12/02/2020    GFRAA >60 12/02/2020    LABGLOM >60 12/02/2020    GLUCOSE 86 12/02/2020     U/A:    Lab Results   Component Value Date    COLORU Yellow 04/28/2016 PROTEINU Negative 04/28/2016    PHUR 6.0 04/28/2016    LABCAST 5-10 Hyaline 04/28/2016    WBCUA 0-2 04/28/2016    RBCUA 0-2 04/28/2016    MUCUS 2+ 04/28/2016    BACTERIA 2+ 04/28/2016    CLARITYU Clear 04/28/2016    SPECGRAV <=1.005 04/28/2016    LEUKOCYTESUR Negative 04/28/2016    UROBILINOGEN 0.2 04/28/2016    BILIRUBINUR Negative 04/28/2016    BLOODU Negative 04/28/2016    GLUCOSEU Negative 04/28/2016    AMORPHOUS 1+ 04/28/2016       IMPRESSION/RECOMMENDATIONS:   Patient will be taken to surgery for definitive care for the presenting problem(s). The patient has been informed of the goals, risks and benefits of the procedure. Informed consent has been obtained and all of the patient's questions were answered to their satisfaction. The patient wishes to proceed with the planned surgery.       Renetta Espinoza MD, M.D.

## 2021-06-29 ENCOUNTER — OFFICE VISIT (OUTPATIENT)
Dept: ORTHOPEDIC SURGERY | Age: 66
End: 2021-06-29
Payer: MEDICARE

## 2021-06-29 VITALS — HEIGHT: 70 IN | BODY MASS INDEX: 30.78 KG/M2 | WEIGHT: 215 LBS

## 2021-06-29 DIAGNOSIS — M17.11 PRIMARY OSTEOARTHRITIS OF RIGHT KNEE: Primary | ICD-10-CM

## 2021-06-29 PROCEDURE — 20610 DRAIN/INJ JOINT/BURSA W/O US: CPT | Performed by: ORTHOPAEDIC SURGERY

## 2021-06-29 RX ORDER — HYALURONATE SODIUM 10 MG/ML
20 SYRINGE (ML) INTRAARTICULAR ONCE
Status: COMPLETED | OUTPATIENT
Start: 2021-06-29 | End: 2021-06-29

## 2021-06-29 RX ADMIN — Medication 20 MG: at 11:48

## 2021-06-29 NOTE — PROGRESS NOTES
HISTORY OF PRESENT ILLNESS: Patient returns today for the first of a series of three Euflexxa injections done to the right knee that was performed under a sterile Betadine prep, using ethyl chloride as a topical refrigerant, for a diagnosis of osteoarthritis. The injection of 2 ml of Euflexxa was done from an anterolateral joint line approach using a 25-gauge needle. It was done without incident and the patient tolerated it well. PLAN: The patient should return next week to obtain their second out of a series of three injections of Euflexxa. Encounter Diagnosis   Name Primary?     Primary osteoarthritis of right knee Yes      Orders Placed This Encounter   Procedures    OK ARTHROCENTESIS ASPIR&/INJ MAJOR JT/BURSA W/O US

## 2021-07-06 ENCOUNTER — OFFICE VISIT (OUTPATIENT)
Dept: ORTHOPEDIC SURGERY | Age: 66
End: 2021-07-06
Payer: MEDICARE

## 2021-07-06 VITALS — BODY MASS INDEX: 30.78 KG/M2 | WEIGHT: 215 LBS | HEIGHT: 70 IN

## 2021-07-06 DIAGNOSIS — M17.11 PRIMARY OSTEOARTHRITIS OF RIGHT KNEE: Primary | ICD-10-CM

## 2021-07-06 PROCEDURE — 20610 DRAIN/INJ JOINT/BURSA W/O US: CPT | Performed by: ORTHOPAEDIC SURGERY

## 2021-07-06 RX ORDER — HYALURONATE SODIUM 10 MG/ML
20 SYRINGE (ML) INTRAARTICULAR ONCE
Status: COMPLETED | OUTPATIENT
Start: 2021-07-06 | End: 2021-07-06

## 2021-07-06 RX ADMIN — Medication 20 MG: at 11:20

## 2021-07-13 ENCOUNTER — OFFICE VISIT (OUTPATIENT)
Dept: ORTHOPEDIC SURGERY | Age: 66
End: 2021-07-13
Payer: MEDICARE

## 2021-07-13 VITALS — WEIGHT: 215 LBS | BODY MASS INDEX: 30.78 KG/M2 | HEIGHT: 70 IN

## 2021-07-13 DIAGNOSIS — M17.11 PRIMARY OSTEOARTHRITIS OF RIGHT KNEE: Primary | ICD-10-CM

## 2021-07-13 PROCEDURE — 20610 DRAIN/INJ JOINT/BURSA W/O US: CPT | Performed by: ORTHOPAEDIC SURGERY

## 2021-07-13 RX ORDER — HYALURONATE SODIUM 10 MG/ML
20 SYRINGE (ML) INTRAARTICULAR ONCE
Status: COMPLETED | OUTPATIENT
Start: 2021-07-13 | End: 2021-07-13

## 2021-07-13 RX ADMIN — Medication 20 MG: at 11:20

## 2021-07-13 NOTE — PROGRESS NOTES
HISTORY OF PRESENT ILLNESS: Patient returns today for their third out of a series of three Euflexxa injections done to the right knee that was performed under a sterile Betadine prep, using ethyl chloride as topical refrigerant, for a diagnosis of osteoarthritis. The injection of 2 ml of Euflexxa was done from an anterolateral joint line approach using a 25-gauge needle. It was done without incident and was tolerated well. PLAN: The patient should return in two months for followup if needed. Encounter Diagnosis   Name Primary?     Primary osteoarthritis of right knee Yes      Orders Placed This Encounter   Procedures    OK ARTHROCENTESIS ASPIR&/INJ MAJOR JT/BURSA W/O US

## 2021-11-16 ENCOUNTER — OFFICE VISIT (OUTPATIENT)
Dept: ORTHOPEDIC SURGERY | Age: 66
End: 2021-11-16
Payer: MEDICARE

## 2021-11-16 ENCOUNTER — TELEPHONE (OUTPATIENT)
Dept: ORTHOPEDIC SURGERY | Age: 66
End: 2021-11-16

## 2021-11-16 VITALS — WEIGHT: 215 LBS | BODY MASS INDEX: 30.78 KG/M2 | HEIGHT: 70 IN

## 2021-11-16 DIAGNOSIS — M25.561 ACUTE PAIN OF RIGHT KNEE: Primary | ICD-10-CM

## 2021-11-16 PROCEDURE — 99212 OFFICE O/P EST SF 10 MIN: CPT | Performed by: ORTHOPAEDIC SURGERY

## 2021-11-16 NOTE — PROGRESS NOTES
He presents today requesting cortisone injection. He says he wonders what his status was and I told him that and review the x-rays that we did in May where he had grade 3+ osteoarthritis, and he was given viscosupplementation with cortisone which lasted a few months, I would recommend this time proceeding with a request for an MRI of his right knee to see if he in fact would have any condition such as stress reaction in the knee that may benefit from a different procedure short of knee replacement surgery. I will change her from meloxicam to Daypro to see if it may be more effective.

## 2021-11-22 ENCOUNTER — HOSPITAL ENCOUNTER (OUTPATIENT)
Dept: MRI IMAGING | Age: 66
Discharge: HOME | End: 2021-11-22
Payer: MEDICARE

## 2021-11-22 DIAGNOSIS — M25.561 ACUTE PAIN OF RIGHT KNEE: ICD-10-CM

## 2021-11-22 PROCEDURE — 73721 MRI JNT OF LWR EXTRE W/O DYE: CPT

## 2021-11-30 ENCOUNTER — TELEPHONE (OUTPATIENT)
Dept: ORTHOPEDIC SURGERY | Age: 66
End: 2021-11-30

## 2021-11-30 ENCOUNTER — OFFICE VISIT (OUTPATIENT)
Dept: ORTHOPEDIC SURGERY | Age: 66
End: 2021-11-30
Payer: MEDICARE

## 2021-11-30 VITALS — HEIGHT: 70 IN | BODY MASS INDEX: 30.78 KG/M2 | WEIGHT: 215 LBS

## 2021-11-30 DIAGNOSIS — M23.203 DEGENERATIVE TEAR OF MEDIAL MENISCUS OF RIGHT KNEE: Primary | ICD-10-CM

## 2021-11-30 DIAGNOSIS — M84.469G INSUFFICIENCY FRACTURE OF TIBIA WITH DELAYED HEALING, SUBSEQUENT ENCOUNTER: ICD-10-CM

## 2021-11-30 DIAGNOSIS — M17.11 PRIMARY OSTEOARTHRITIS OF RIGHT KNEE: ICD-10-CM

## 2021-11-30 PROBLEM — M84.469A INSUFFICIENCY FRACTURE OF TIBIA: Status: ACTIVE | Noted: 2021-11-30

## 2021-11-30 PROCEDURE — E0114 CRUTCH UNDERARM PAIR NO WOOD: HCPCS | Performed by: ORTHOPAEDIC SURGERY

## 2021-11-30 PROCEDURE — 99212 OFFICE O/P EST SF 10 MIN: CPT | Performed by: ORTHOPAEDIC SURGERY

## 2021-11-30 RX ORDER — CELECOXIB 200 MG/1
200 CAPSULE ORAL DAILY
Qty: 30 CAPSULE | Refills: 2 | Status: SHIPPED | OUTPATIENT
Start: 2021-11-30 | End: 2022-04-27

## 2021-11-30 NOTE — PROGRESS NOTES
Returns today for evaluation of the MRI of his right knee. He does not fact have a complex tear of the medial meniscus as well as stress reaction in the medial tibial plateau. At this time I have advised him to undergo arthroscopic intervention for the medial meniscectomy and subchondroplasty of the medial tibial plateau. The patient was counseled at length about the risks of quan Covid-19 during their perioperative period and any recovery window from their procedure. The patient was made aware that quan Covid-19  may worsen their prognosis for recovering from their procedure  and lend to a higher morbidity and/or mortality risk. All material risks, benefits, and reasonable alternatives including postponing the procedure were discussed. The patient does wish to proceed with the procedure at this time. After an evaluation of this patient and a review of his radiographic testing, it would be my opinion that the symptoms, for which I am treating him are mechanical in origin. Although he has concomitant osteoarthritic changes, his joint space has greater than 50% of the articular surfaces left within the knee compartment. Additionally he is failed to improve over time with a physician directed physical therapy program, consideration of bracing, medications, and injections. It is with those observations both objective and subjective that I would recommend proceeding with arthroscopic intervention to his need to address the meniscal pathology. INFORMED CONSENT NOTE        We discussed the risks, benefits, and alternatives to the proposed procedure, as well as the necessity of other members of the healthcare team participating in the procedure. All questions were answered and the patient elected to proceed with the proposed procedure and signed the informed consent form.

## 2021-12-16 ENCOUNTER — ANESTHESIA EVENT (OUTPATIENT)
Dept: OPERATING ROOM | Age: 66
End: 2021-12-16
Payer: MEDICARE

## 2021-12-21 ENCOUNTER — HOSPITAL ENCOUNTER (OUTPATIENT)
Age: 66
Discharge: HOME | End: 2021-12-21
Payer: MEDICARE

## 2021-12-21 DIAGNOSIS — M23.203 DEGENERATIVE TEAR OF MEDIAL MENISCUS OF RIGHT KNEE: ICD-10-CM

## 2021-12-21 DIAGNOSIS — M84.469G INSUFFICIENCY FRACTURE OF TIBIA WITH DELAYED HEALING, SUBSEQUENT ENCOUNTER: ICD-10-CM

## 2021-12-21 LAB — SARS-COV-2: NOT DETECTED

## 2021-12-21 PROCEDURE — U0003 INFECTIOUS AGENT DETECTION BY NUCLEIC ACID (DNA OR RNA); SEVERE ACUTE RESPIRATORY SYNDROME CORONAVIRUS 2 (SARS-COV-2) (CORONAVIRUS DISEASE [COVID-19]), AMPLIFIED PROBE TECHNIQUE, MAKING USE OF HIGH THROUGHPUT TECHNOLOGIES AS DESCRIBED BY CMS-2020-01-R: HCPCS

## 2021-12-21 PROCEDURE — U0005 INFEC AGEN DETEC AMPLI PROBE: HCPCS

## 2021-12-27 ENCOUNTER — HOSPITAL ENCOUNTER (OUTPATIENT)
Age: 66
Setting detail: OUTPATIENT SURGERY
Discharge: HOME | End: 2021-12-27
Attending: ORTHOPAEDIC SURGERY | Admitting: ORTHOPAEDIC SURGERY
Payer: MEDICARE

## 2021-12-27 ENCOUNTER — ANESTHESIA (OUTPATIENT)
Dept: OPERATING ROOM | Age: 66
End: 2021-12-27
Payer: MEDICARE

## 2021-12-27 VITALS
HEART RATE: 74 BPM | WEIGHT: 215 LBS | DIASTOLIC BLOOD PRESSURE: 80 MMHG | BODY MASS INDEX: 30.1 KG/M2 | TEMPERATURE: 97.7 F | RESPIRATION RATE: 20 BRPM | SYSTOLIC BLOOD PRESSURE: 140 MMHG | OXYGEN SATURATION: 98 % | HEIGHT: 71 IN

## 2021-12-27 VITALS — DIASTOLIC BLOOD PRESSURE: 63 MMHG | OXYGEN SATURATION: 97 % | SYSTOLIC BLOOD PRESSURE: 100 MMHG

## 2021-12-27 DIAGNOSIS — M84.469G INSUFFICIENCY FRACTURE OF TIBIA WITH DELAYED HEALING, SUBSEQUENT ENCOUNTER: Primary | ICD-10-CM

## 2021-12-27 DIAGNOSIS — M23.203 DEGENERATIVE TEAR OF MEDIAL MENISCUS OF RIGHT KNEE: ICD-10-CM

## 2021-12-27 PROCEDURE — 7100000010 HC PHASE II RECOVERY - FIRST 15 MIN: Performed by: ORTHOPAEDIC SURGERY

## 2021-12-27 PROCEDURE — 3600000004 HC SURGERY LEVEL 4 BASE: Performed by: ORTHOPAEDIC SURGERY

## 2021-12-27 PROCEDURE — 6360000002 HC RX W HCPCS: Performed by: ANESTHESIOLOGY

## 2021-12-27 PROCEDURE — 6370000000 HC RX 637 (ALT 250 FOR IP): Performed by: ANESTHESIOLOGY

## 2021-12-27 PROCEDURE — 3700000000 HC ANESTHESIA ATTENDED CARE: Performed by: ORTHOPAEDIC SURGERY

## 2021-12-27 PROCEDURE — 7100000011 HC PHASE II RECOVERY - ADDTL 15 MIN: Performed by: ORTHOPAEDIC SURGERY

## 2021-12-27 PROCEDURE — 2709999900 HC NON-CHARGEABLE SUPPLY: Performed by: ORTHOPAEDIC SURGERY

## 2021-12-27 PROCEDURE — 6360000002 HC RX W HCPCS: Performed by: NURSE ANESTHETIST, CERTIFIED REGISTERED

## 2021-12-27 PROCEDURE — 2500000003 HC RX 250 WO HCPCS: Performed by: ORTHOPAEDIC SURGERY

## 2021-12-27 PROCEDURE — 64445 NJX AA&/STRD SCIATIC NRV IMG: CPT | Performed by: ANESTHESIOLOGY

## 2021-12-27 PROCEDURE — C1713 ANCHOR/SCREW BN/BN,TIS/BN: HCPCS | Performed by: ORTHOPAEDIC SURGERY

## 2021-12-27 PROCEDURE — 7100000000 HC PACU RECOVERY - FIRST 15 MIN: Performed by: ORTHOPAEDIC SURGERY

## 2021-12-27 PROCEDURE — 64447 NJX AA&/STRD FEMORAL NRV IMG: CPT | Performed by: ANESTHESIOLOGY

## 2021-12-27 PROCEDURE — 6360000002 HC RX W HCPCS: Performed by: ORTHOPAEDIC SURGERY

## 2021-12-27 PROCEDURE — 3600000014 HC SURGERY LEVEL 4 ADDTL 15MIN: Performed by: ORTHOPAEDIC SURGERY

## 2021-12-27 PROCEDURE — 2500000003 HC RX 250 WO HCPCS: Performed by: ANESTHESIOLOGY

## 2021-12-27 PROCEDURE — 2500000003 HC RX 250 WO HCPCS: Performed by: NURSE ANESTHETIST, CERTIFIED REGISTERED

## 2021-12-27 PROCEDURE — 3700000001 HC ADD 15 MINUTES (ANESTHESIA): Performed by: ORTHOPAEDIC SURGERY

## 2021-12-27 PROCEDURE — 2580000003 HC RX 258: Performed by: ANESTHESIOLOGY

## 2021-12-27 DEVICE — IMPLANTABLE DEVICE
Type: IMPLANTABLE DEVICE | Site: KNEE | Status: FUNCTIONAL
Brand: SCP® PF KNEE KIT

## 2021-12-27 RX ORDER — FENTANYL CITRATE 50 UG/ML
INJECTION, SOLUTION INTRAMUSCULAR; INTRAVENOUS
Status: COMPLETED
Start: 2021-12-27 | End: 2021-12-27

## 2021-12-27 RX ORDER — FENTANYL CITRATE 50 UG/ML
INJECTION, SOLUTION INTRAMUSCULAR; INTRAVENOUS PRN
Status: DISCONTINUED | OUTPATIENT
Start: 2021-12-27 | End: 2021-12-27 | Stop reason: SDUPTHER

## 2021-12-27 RX ORDER — BUPIVACAINE HYDROCHLORIDE 2.5 MG/ML
INJECTION, SOLUTION INFILTRATION; PERINEURAL PRN
Status: DISCONTINUED | OUTPATIENT
Start: 2021-12-27 | End: 2021-12-27 | Stop reason: ALTCHOICE

## 2021-12-27 RX ORDER — PHENYLEPHRINE HCL IN 0.9% NACL 1 MG/10 ML
SYRINGE (ML) INTRAVENOUS PRN
Status: DISCONTINUED | OUTPATIENT
Start: 2021-12-27 | End: 2021-12-27 | Stop reason: SDUPTHER

## 2021-12-27 RX ORDER — SODIUM CHLORIDE 0.9 % (FLUSH) 0.9 %
10 SYRINGE (ML) INJECTION EVERY 12 HOURS SCHEDULED
Status: DISCONTINUED | OUTPATIENT
Start: 2021-12-27 | End: 2021-12-27 | Stop reason: HOSPADM

## 2021-12-27 RX ORDER — PROPOFOL 10 MG/ML
INJECTION, EMULSION INTRAVENOUS PRN
Status: DISCONTINUED | OUTPATIENT
Start: 2021-12-27 | End: 2021-12-27 | Stop reason: SDUPTHER

## 2021-12-27 RX ORDER — HYDRALAZINE HYDROCHLORIDE 20 MG/ML
5 INJECTION INTRAMUSCULAR; INTRAVENOUS EVERY 10 MIN PRN
Status: DISCONTINUED | OUTPATIENT
Start: 2021-12-27 | End: 2021-12-27 | Stop reason: HOSPADM

## 2021-12-27 RX ORDER — MORPHINE SULFATE 2 MG/ML
2 INJECTION, SOLUTION INTRAMUSCULAR; INTRAVENOUS EVERY 5 MIN PRN
Status: DISCONTINUED | OUTPATIENT
Start: 2021-12-27 | End: 2021-12-27 | Stop reason: HOSPADM

## 2021-12-27 RX ORDER — ONDANSETRON 2 MG/ML
4 INJECTION INTRAMUSCULAR; INTRAVENOUS PRN
Status: DISCONTINUED | OUTPATIENT
Start: 2021-12-27 | End: 2021-12-27 | Stop reason: HOSPADM

## 2021-12-27 RX ORDER — OXYCODONE HYDROCHLORIDE AND ACETAMINOPHEN 5; 325 MG/1; MG/1
1 TABLET ORAL EVERY 6 HOURS PRN
Qty: 28 TABLET | Refills: 0 | Status: SHIPPED | OUTPATIENT
Start: 2021-12-27 | End: 2022-01-03

## 2021-12-27 RX ORDER — BUPIVACAINE HYDROCHLORIDE 2.5 MG/ML
INJECTION, SOLUTION EPIDURAL; INFILTRATION; INTRACAUDAL
Status: DISCONTINUED
Start: 2021-12-27 | End: 2021-12-27 | Stop reason: HOSPADM

## 2021-12-27 RX ORDER — OXYCODONE HYDROCHLORIDE AND ACETAMINOPHEN 5; 325 MG/1; MG/1
1 TABLET ORAL PRN
Status: COMPLETED | OUTPATIENT
Start: 2021-12-27 | End: 2021-12-27

## 2021-12-27 RX ORDER — DEXAMETHASONE SODIUM PHOSPHATE 4 MG/ML
INJECTION, SOLUTION INTRA-ARTICULAR; INTRALESIONAL; INTRAMUSCULAR; INTRAVENOUS; SOFT TISSUE PRN
Status: DISCONTINUED | OUTPATIENT
Start: 2021-12-27 | End: 2021-12-27 | Stop reason: SDUPTHER

## 2021-12-27 RX ORDER — LABETALOL HYDROCHLORIDE 5 MG/ML
5 INJECTION, SOLUTION INTRAVENOUS EVERY 10 MIN PRN
Status: DISCONTINUED | OUTPATIENT
Start: 2021-12-27 | End: 2021-12-27 | Stop reason: HOSPADM

## 2021-12-27 RX ORDER — ONDANSETRON 2 MG/ML
INJECTION INTRAMUSCULAR; INTRAVENOUS PRN
Status: DISCONTINUED | OUTPATIENT
Start: 2021-12-27 | End: 2021-12-27 | Stop reason: SDUPTHER

## 2021-12-27 RX ORDER — MIDAZOLAM HYDROCHLORIDE 1 MG/ML
INJECTION INTRAMUSCULAR; INTRAVENOUS PRN
Status: DISCONTINUED | OUTPATIENT
Start: 2021-12-27 | End: 2021-12-27 | Stop reason: SDUPTHER

## 2021-12-27 RX ORDER — OXYCODONE HYDROCHLORIDE AND ACETAMINOPHEN 5; 325 MG/1; MG/1
2 TABLET ORAL PRN
Status: COMPLETED | OUTPATIENT
Start: 2021-12-27 | End: 2021-12-27

## 2021-12-27 RX ORDER — LIDOCAINE HYDROCHLORIDE 10 MG/ML
1 INJECTION, SOLUTION EPIDURAL; INFILTRATION; INTRACAUDAL; PERINEURAL
Status: COMPLETED | OUTPATIENT
Start: 2021-12-27 | End: 2021-12-27

## 2021-12-27 RX ORDER — SODIUM CHLORIDE 9 MG/ML
25 INJECTION, SOLUTION INTRAVENOUS PRN
Status: DISCONTINUED | OUTPATIENT
Start: 2021-12-27 | End: 2021-12-27 | Stop reason: HOSPADM

## 2021-12-27 RX ORDER — SODIUM CHLORIDE, SODIUM LACTATE, POTASSIUM CHLORIDE, CALCIUM CHLORIDE 600; 310; 30; 20 MG/100ML; MG/100ML; MG/100ML; MG/100ML
INJECTION, SOLUTION INTRAVENOUS CONTINUOUS
Status: DISCONTINUED | OUTPATIENT
Start: 2021-12-27 | End: 2021-12-27 | Stop reason: HOSPADM

## 2021-12-27 RX ORDER — DIPHENHYDRAMINE HYDROCHLORIDE 50 MG/ML
12.5 INJECTION INTRAMUSCULAR; INTRAVENOUS
Status: DISCONTINUED | OUTPATIENT
Start: 2021-12-27 | End: 2021-12-27 | Stop reason: HOSPADM

## 2021-12-27 RX ORDER — MORPHINE SULFATE 2 MG/ML
1 INJECTION, SOLUTION INTRAMUSCULAR; INTRAVENOUS EVERY 5 MIN PRN
Status: DISCONTINUED | OUTPATIENT
Start: 2021-12-27 | End: 2021-12-27 | Stop reason: HOSPADM

## 2021-12-27 RX ORDER — PROMETHAZINE HYDROCHLORIDE 25 MG/ML
6.25 INJECTION, SOLUTION INTRAMUSCULAR; INTRAVENOUS
Status: DISCONTINUED | OUTPATIENT
Start: 2021-12-27 | End: 2021-12-27 | Stop reason: HOSPADM

## 2021-12-27 RX ORDER — SODIUM CHLORIDE 0.9 % (FLUSH) 0.9 %
10 SYRINGE (ML) INJECTION PRN
Status: DISCONTINUED | OUTPATIENT
Start: 2021-12-27 | End: 2021-12-27 | Stop reason: HOSPADM

## 2021-12-27 RX ORDER — MIDAZOLAM HYDROCHLORIDE 1 MG/ML
INJECTION INTRAMUSCULAR; INTRAVENOUS
Status: COMPLETED
Start: 2021-12-27 | End: 2021-12-27

## 2021-12-27 RX ORDER — LIDOCAINE HYDROCHLORIDE 10 MG/ML
INJECTION, SOLUTION INFILTRATION; PERINEURAL PRN
Status: DISCONTINUED | OUTPATIENT
Start: 2021-12-27 | End: 2021-12-27 | Stop reason: SDUPTHER

## 2021-12-27 RX ADMIN — SODIUM CHLORIDE, POTASSIUM CHLORIDE, SODIUM LACTATE AND CALCIUM CHLORIDE: 600; 310; 30; 20 INJECTION, SOLUTION INTRAVENOUS at 13:30

## 2021-12-27 RX ADMIN — OXYCODONE HYDROCHLORIDE AND ACETAMINOPHEN 1 TABLET: 5; 325 TABLET ORAL at 15:35

## 2021-12-27 RX ADMIN — MIDAZOLAM 2 MG: 1 INJECTION INTRAMUSCULAR; INTRAVENOUS at 13:38

## 2021-12-27 RX ADMIN — DEXAMETHASONE SODIUM PHOSPHATE 8 MG: 4 INJECTION, SOLUTION INTRAMUSCULAR; INTRAVENOUS at 14:54

## 2021-12-27 RX ADMIN — LIDOCAINE HYDROCHLORIDE 40 MG: 10 INJECTION, SOLUTION INFILTRATION; PERINEURAL at 14:50

## 2021-12-27 RX ADMIN — FENTANYL CITRATE 50 MCG: 50 INJECTION INTRAMUSCULAR; INTRAVENOUS at 14:54

## 2021-12-27 RX ADMIN — FENTANYL CITRATE 100 MCG: 50 INJECTION INTRAMUSCULAR; INTRAVENOUS at 13:38

## 2021-12-27 RX ADMIN — ONDANSETRON HYDROCHLORIDE 4 MG: 2 INJECTION, SOLUTION INTRAMUSCULAR; INTRAVENOUS at 15:11

## 2021-12-27 RX ADMIN — PROPOFOL 200 MG: 10 INJECTION, EMULSION INTRAVENOUS at 14:50

## 2021-12-27 RX ADMIN — LIDOCAINE HYDROCHLORIDE 0.1 ML: 10 INJECTION, SOLUTION EPIDURAL; INFILTRATION; INTRACAUDAL; PERINEURAL at 13:29

## 2021-12-27 RX ADMIN — Medication 100 MCG: at 14:59

## 2021-12-27 RX ADMIN — Medication 1500 MG: at 13:58

## 2021-12-27 RX ADMIN — FENTANYL CITRATE 50 MCG: 50 INJECTION INTRAMUSCULAR; INTRAVENOUS at 14:47

## 2021-12-27 ASSESSMENT — PAIN SCALES - GENERAL
PAINLEVEL_OUTOF10: 3
PAINLEVEL_OUTOF10: 0

## 2021-12-27 ASSESSMENT — PAIN - FUNCTIONAL ASSESSMENT: PAIN_FUNCTIONAL_ASSESSMENT: 0-10

## 2021-12-27 NOTE — ANESTHESIA PROCEDURE NOTES
Peripheral Block    Patient location during procedure: pre-op  Staffing  Performed: anesthesiologist   Anesthesiologist: Cornel Blackburn MD  Preanesthetic Checklist  Completed: patient identified, IV checked, site marked, risks and benefits discussed, surgical consent, monitors and equipment checked, pre-op evaluation, timeout performed, anesthesia consent given, oxygen available and patient being monitored  Peripheral Block  Prep: ChloraPrep  Patient monitoring: cardiac monitor, continuous pulse ox, frequent blood pressure checks and IV access  Block type: Sciatic  Laterality: right  Injection technique: single-shot  Guidance: ultrasound guided  Local infiltration: lidocaine  Infiltration strength: 1 %  Dose: 3 mL  Popliteal  Provider prep: mask and sterile gloves  Local infiltration: lidocaine  Needle  Needle gauge: 21 G  Needle length: 10 cm  Needle localization: ultrasound guidance and nerve stimulator  Assessment  Injection assessment: negative aspiration for heme, no paresthesia on injection and local visualized surrounding nerve on ultrasound  Paresthesia pain: none  Slow fractionated injection: yes  Hemodynamics: stable  Additional Notes  Immediately prior to procedure a \"time out\" was called to verify the correct patient, allergies, laterality, procedure and equipment. Time out performed with  RN    Local Anesthetic: 0.25 %  Bupivacaine   Amount: 30 ml  in 5 ml increments after negative aspiration each time. Biceps Femoris muscle (long head), Vastus lateralis muscle, Sciatic nerve (Tibia and Common Peroneal Nerves) and Popliteal artery are identified; the tip of the needle and the spread of the local anesthetic around the Tibial and Common Peroneal Nerve are visualized. The Sciatic Nerve (Tibia and Common Peroneal Nerve) appeared to be anatomically normal and there were no abnormal pathologically findings seen.          Reason for block: post-op pain management and at surgeon's request

## 2021-12-27 NOTE — ANESTHESIA PROCEDURE NOTES
Peripheral Block    Patient location during procedure: pre-op  Staffing  Performed: anesthesiologist   Anesthesiologist: Mike Cespedes MD  Preanesthetic Checklist  Completed: patient identified, IV checked, site marked, risks and benefits discussed, surgical consent, monitors and equipment checked, pre-op evaluation, timeout performed, anesthesia consent given, oxygen available and patient being monitored  Peripheral Block  Patient position: supine  Prep: ChloraPrep  Patient monitoring: cardiac monitor, continuous pulse ox, frequent blood pressure checks and IV access  Block type: Femoral  Laterality: right  Injection technique: single-shot  Guidance: ultrasound guided  Local infiltration: lidocaine  Infiltration strength: 1 %  Dose: 3 mL  Approach to block: Low Femoral.  Provider prep: mask and sterile gloves  Local infiltration: lidocaine  Needle  Needle gauge: 21 G  Needle length: 10 cm  Needle localization: ultrasound guidance  Assessment  Injection assessment: negative aspiration for heme, no paresthesia on injection and local visualized surrounding nerve on ultrasound  Paresthesia pain: none  Slow fractionated injection: yes  Hemodynamics: stable  Additional Notes  Immediately prior to procedure a \"time out\" was called to verify the correct patient, allergies, laterality, procedure and equipment. Time out performed with  RN    Local Anesthetic: 0.25 %  Bupivacaine   Amount: 20 ml  in 5 ml increments after negative aspiration each time. Iliopsoas Muscle and Fascia Iliaca, Femoral artery (Deep artery to the thigh take off), Femoral Vein and Femoral Nerve are identified; the tip of the need and the spread of the local anesthetic around the Femoral nerve are visualized. The Femoral nerve appeared to be anatomically normal and there were no abnormal pathologically findings seen.          Reason for block: post-op pain management and at surgeon's request

## 2021-12-27 NOTE — BRIEF OP NOTE
Brief Postoperative Note      Patient: Mesha Monzon  YOB: 1955  MRN: [id number]    Date of Procedure: 12/27/2021    Pre-Op Diagnosis: RIGHT KNEE MEDIAL MENISCUS TEAR, INSUFFICIENCY FRACTURE OF THE MEDIAL TIBIAL PLATEAU    Post-Op Diagnosis: Same       Procedure(s):  RIGHT KNEE VIDEO ARTHROSCOPY MEDIAL MENISCECTOMY, INTERNAL FIXATION MEDIAL TIBIAL PLATEAU INSUFFICIENCY FRACTURE WITH BONE SUBSTITUTE    Surgeon(s):  Isabel Peterson MD    Assistant:  Surgical Assistant: Elysia Leblanc    Anesthesia: General    Estimated Blood Loss (mL): Minimal    Complications: None    Specimens:   * No specimens in log *    Implants:  Implant Name Type Inv.  Item Serial No.  Lot No. LRB No. Used Action   KIT BNE GRFT DEL KNEE FOR SUBCHONDROPLASTY ACCUPORT  KIT BNE GRFT DEL KNEE FOR SUBCHONDROPLASTY ACCUPORT  [employer] [id number] Right 1 Implanted         Drains: * No LDAs found *    Findings: BENEDICTO    Electronically signed by Isabel Peterson MD on 12/27/2021 at 3:12 PM

## 2021-12-27 NOTE — PROGRESS NOTES
Patient on PACU stretcher and placed on cardiac monitor for nerve block. Time Out done at bedside with anesthesiologist .  Assisted anesthesiologist with nerve block.

## 2021-12-27 NOTE — PROGRESS NOTES
Discharge  instructions reviewed. Pt and family verbalize understanding with no further questions. VSS. Pt discharged via [facility] to car. Assessment unchanged.

## 2021-12-27 NOTE — ANESTHESIA PRE PROCEDURE
Department of Anesthesiology  Preprocedure Note       Name:  Ethan Ma   Age:  77 y.o.  :  1955                                          MRN:  [id number]         Date:  2021      Surgeon: Darin Del Toro):  Alberto Angulo MD    Procedure: Procedure(s):  RIGHT KNEE VIDEO ARTHROSCOPY MEDIAL MENISCECTOMY, INTERNAL FIXATION MEDIAL TIBIAL PLATEAU INSUFFICIENCY FRACTURE WITH BONE SUBSTITUTE    Medications prior to admission:   Prior to Admission medications    Medication Sig Start Date End Date Taking? Authorizing Provider   celecoxib (CELEBREX) 200 MG capsule Take 1 capsule by mouth daily 21   Alberto Angulo MD   oxaprozin (DAYPRO) 600 MG tablet Take 1 tablet by mouth 2 times daily 11/16/21 3/16/22  Alberto Angulo MD   famotidine (PEPCID) 20 MG tablet Take 20 mg by mouth daily    Historical Provider, MD   Omega-3 Fatty Acids (FISH OIL) 1000 MG CAPS Take 1,000 mg by mouth daily    Historical Provider, MD   meloxicam (MOBIC) 15 MG tablet  3/26/21   Historical Provider, MD   montelukast (SINGULAIR) 10 MG tablet Take 10 mg by mouth nightly  3/26/21   Historical Provider, MD   lisinopril (PRINIVIL;ZESTRIL) 20 MG tablet Take 20 mg by mouth daily    Historical Provider, MD   fluticasone (FLONASE) 50 MCG/ACT nasal spray 1 spray by Nasal route 2 times daily    Historical Provider, MD   amLODIPine (NORVASC) 5 MG tablet Take 5 mg by mouth daily  3/26/21   Historical Provider, MD   MAGNESIUM PO Take by mouth nightly    Historical Provider, MD   Azelastine-Fluticasone 137-50 MCG/ACT SUSP 1 spray by Nasal route 2 times daily    Historical Provider, MD   simvastatin (ZOCOR) 20 MG tablet Take 20 mg by mouth nightly. Historical Provider, MD   cetirizine (ZYRTEC) 10 MG tablet Take 10 mg by mouth daily. Historical Provider, MD   multivitamin [facility]) per tablet Take 1 tablet by mouth daily.       Historical Provider, MD       Current medications:    No current facility-administered medications for this encounter. Allergies:  No Known Allergies    Problem List:    Patient Active Problem List   Diagnosis Code    C. difficile colitis A04.72    Hyperlipidemia E78.5    Reflux EMP2247    Degenerative tear of medial meniscus of right knee M23.203    Insufficiency fracture of tibia M84.469A       Past Medical History:        Diagnosis Date    Arthritis     BPH (benign prostatic hyperplasia)     Clostridium difficile infection 5/18/16, 4/28/16    Diverticulitis     Hyperlipidemia     Hypertension     Reflux     Seasonal allergies        Past Surgical History:        Procedure Laterality Date    APPENDECTOMY      COLONOSCOPY  2005    hx of polyps    COLONOSCOPY  06/28/2016    CYSTOSCOPY N/A 6/9/2021    CYSTOSCOPY, URETHRAL DILATATION performed by Yisel Clemente MD at [facility]  12/20/12    left and isthmusectomy       Social History:    Social History     Tobacco Use    Smoking status: Never Smoker    Smokeless tobacco: Never Used   Substance Use Topics    Alcohol use: No                                Counseling given: Not Answered      Vital Signs (Current): There were no vitals filed for this visit.                                            BP Readings from Last 3 Encounters:   06/09/21 126/77   06/09/21 128/70   07/24/20 (!) 147/79       NPO Status:                                                                                 BMI:   Wt Readings from Last 3 Encounters:   11/30/21 215 lb (97.5 kg)   11/16/21 215 lb (97.5 kg)   07/13/21 215 lb (97.5 kg)     There is no height or weight on file to calculate BMI.    CBC:   Lab Results   Component Value Date    WBC 5.4 12/02/2020    RBC 4.22 12/02/2020    HGB 13.0 12/02/2020    HCT 37.7 12/02/2020    MCV 89.3 12/02/2020    RDW 13.3 12/02/2020     12/02/2020       CMP:   Lab Results   Component Value Date     12/02/2020    K 4.5 12/02/2020     12/02/2020    CO2 26 12/02/2020    BUN Never Used   Substance Use Topics    Alcohol use: No       LABS:    CBC  Lab Results   Component Value Date/Time    WBC 5.4 12/02/2020 05:11 PM    HGB 13.0 (L) 12/02/2020 05:11 PM    HCT 37.7 (L) 12/02/2020 05:11 PM     12/02/2020 05:11 PM     RENAL  Lab Results   Component Value Date/Time     12/02/2020 05:11 PM    K 4.5 12/02/2020 05:11 PM     12/02/2020 05:11 PM    CO2 26 12/02/2020 05:11 PM    BUN 19 12/02/2020 05:11 PM    CREATININE 1.0 12/02/2020 05:11 PM    GLUCOSE 86 12/02/2020 05:11 PM     COAGS  Lab Results   Component Value Date/Time    PROTIME 10.7 12/02/2020 05:12 PM    INR 0.92 12/02/2020 05:12 PM            Anesthesia Plan      general     ASA 2     (I discussed with the patient the risks and benefits of regional anesthesia (inlcuding infection, bleeding, damage to nerves and surrounding structures) PIV, General, IV Narcotics, PACU. All questions were answered the patient agrees with the plan and wishes to proceed.)  Induction: intravenous.                           Rober Alexis MD   12/27/2021

## 2021-12-27 NOTE — ANESTHESIA POSTPROCEDURE EVALUATION
Department of Anesthesiology  Postprocedure Note    Patient: Sheree Bui  MRN: [id number]  YOB: 1955  Date of evaluation: 12/27/2021  Time:  3:46 PM     Procedure Summary     Date: 12/27/21 Room / Location: [address] [place] [address] OR 02 / [facility]    Anesthesia Start: 6895 Anesthesia Stop: 4153    Procedure: RIGHT KNEE VIDEO ARTHROSCOPY MEDIAL MENISCECTOMY, INTERNAL FIXATION MEDIAL TIBIAL PLATEAU INSUFFICIENCY FRACTURE WITH BONE SUBSTITUTE (Right Knee) Diagnosis: (RIGHT KNEE MEDIAL MENISCUS TEAR)    Surgeons: Chau Raymond MD Responsible Provider: Mono Scruggs MD    Anesthesia Type: general ASA Status: 2          Anesthesia Type: general    Guerita Phase I: Guerita Score: 10    Guerita Phase II: Guerita Score: 10    Last vitals: Reviewed and per EMR flowsheets.        Anesthesia Post Evaluation    Comments: Postoperative Anesthesia Note    Name:    Sheree Bui  MRN:      [id number]    Patient Vitals in the past 12 hrs:  12/27/21 1532, BP:133/89, Pulse:76, Resp:20, SpO2:97 %  12/27/21 1527, BP:128/80, Pulse:77, Resp:16, SpO2:95 %  12/27/21 1522, BP:138/89, Temp:97.7 °F (36.5 °C), Temp src:Temporal, Pulse:76, Resp:16, SpO2:97 %  12/27/21 1425, BP:120/67, Pulse:75, Resp:14, SpO2:96 %  12/27/21 1410, BP:108/60, Pulse:80, Resp:14, SpO2:95 %  12/27/21 1355, BP:(!) 115/58, Pulse:72, Resp:11, SpO2:96 %  12/27/21 1350, BP:(!) 107/57, Pulse:77, Resp:12, SpO2:94 %  12/27/21 1345, BP:108/60, Pulse:76, Resp:13, SpO2:96 %  12/27/21 1340, BP:(!) 105/46, Pulse:79, Resp:11, SpO2:95 %  12/27/21 1259, BP:132/70, Temp:98.6 °F (37 °C), Temp src:Temporal, Pulse:87, Resp:16, SpO2:95 %, Height:5' 10.5\" (1.791 m), Weight:215 lb (97.5 kg)     LABS:    CBC  Lab Results       Component                Value               Date/Time                  WBC                      5.4                 12/02/2020 05:11 PM        HGB                      13.0 (L)            12/02/2020 05:11 PM        HCT                      37.7 (L)            12/02/2020 05:11 PM        PLT                      217                 12/02/2020 05:11 PM   RENAL  Lab Results       Component                Value               Date/Time                  NA                       143                 12/02/2020 05:11 PM        K                        4.5                 12/02/2020 05:11 PM        CL                       106                 12/02/2020 05:11 PM        CO2                      26                  12/02/2020 05:11 PM        BUN                      19                  12/02/2020 05:11 PM        CREATININE               1.0                 12/02/2020 05:11 PM        GLUCOSE                  86                  12/02/2020 05:11 PM   COAGS  Lab Results       Component                Value               Date/Time                  PROTIME                  10.7                12/02/2020 05:12 PM        INR                      0.92                12/02/2020 05:12 PM     Intake & Output:  @23BGMY@    Nausea & Vomiting:  No    Level of Consciousness:  Awake    Pain Assessment:  Adequate analgesia    Anesthesia Complications:  No apparent anesthetic complications    SUMMARY      Vital signs stable  OK to discharge from Stage I post anesthesia care.   Care transferred from Anesthesiology department on discharge from perioperative area

## 2021-12-27 NOTE — H&P
Update History & Physical    The patient's History and Physical  was reviewed with the patient and I examined the patient. There was no change. The surgical site was confirmed by the patient and me. Plan: The risks, benefits, expected outcome, and alternative to the recommended procedure have been discussed with the patient. Patient understands and wants to proceed with the procedure.      Electronically signed by Robina Luevano MD on 12/27/2021 at 12:32 PM

## 2021-12-28 ENCOUNTER — TELEPHONE (OUTPATIENT)
Dept: ORTHOPEDIC SURGERY | Age: 66
End: 2021-12-28

## 2021-12-28 NOTE — OP NOTE
[address]                 [address] [address]                                OPERATIVE REPORT    PATIENT NAME: Marquis Blackburn                      :        1955  MED REC NO:   [id number]                          ROOM:  ACCOUNT NO:   [de-identified]                           ADMIT DATE: 2021  PROVIDER:     Iva Valles MD    DATE OF PROCEDURE:  2021    PREOPERATIVE DIAGNOSES:  Right knee degenerative medial meniscus tear  and an insufficiency fracture of the medial tibial plateau. POSTOPERATIVE DIAGNOSES:  Right knee degenerative medial meniscus tear  and an insufficiency fracture of the medial tibial plateau. OPERATIONS PERFORMED:  Right knee video arthroscopy with partial medial  meniscectomy and internal fixation of the stress fracture of the medial  tibial plateau with bone substitute. SURGEON:  Iva Valles MD    ANESTHESIA:  General, leg block. BLOOD LOSS:  Less than 5 mL. COMPLICATIONS:  None noted. INDICATIONS FOR OPERATION:  This is a 78-year-old male, who presented  with history, exam, and testing consistent with medial meniscus tear and  MRI confirmed he also had a medial tibial plateau insufficiency fracture  or stress reaction. After discussion of treatment options, he elected  for further recommendation of surgical intervention. DESCRIPTION OF OPERATION:  The patient was taken to the operating room,  where general anesthetic was obtained and a leg block had been performed  preoperatively. Antibiotics were given in IV piggyback preoperatively. The right knee and leg were sterilely prepped and draped, and a two-port  arthroscopy of the right knee was carried out in the usual fashion using  a 30-degree arthroscope. Upon entry into the knee, he was noted to have  complex tear of the posterior middle third of the medial meniscus. Patellofemoral joint was otherwise clear.   The ACL, PCL, and lateral  compartment were free of pathology. He did have some grade 4 changes of  osteoarthritic change in the medial tibial plateau. A partial medial  meniscectomy was performed leaving well-contoured and balanced inner rim  through the junction of the posterior middle third. The knee was then  thoroughly irrigated and evacuated of all loose debris and instilled 20  mL of 0.25% Marcaine plain. The port was repaired with 4-0 nylon in a  figure-of-eight fashion. Based on preoperative review of this patient's right knee MRI, the  location of the bone marrow lesion, consistent with an insufficiency or  stress fracture, in the medial tibial plateau was identified. Preoperative surgical planning allowed for determination of the optimal  method for accessing the lesion. Intraoperatively, image fluoroscopy  combined with bone targeting instruments from [address] [place] [address] were  used to guide surgical instruments into the proximity of the subchondral  medial tibial plateau fracture. Specifically, the [employer]  AccuPort injection cannula was placed in the subchondral bone. Standard  repair methodology was used to treat the subchondral bone defect in the  medial tibial plateau. Image fluoroscopy was utilized to confirm  accurate insertion of the AccuPort injection cannula into the  subchondral bone. After insertion, fracture stabilization was performed  by injecting 4 mL of [employer] AccuFill bone substitute into  the medial tibial plateau. Image fluoroscopy was used to monitor the  injection process and ensure injection of the bone substitute into the  subchondral bone, so that following polymerization the bone substitute  stabilized the fracture and facilitated fracture repair. The injection  wounds were closed and sterile dressing was applied.         Ginger Martinez MD    D: 12/27/2021 15:27:33       T: 12/28/2021 1:01:16     [email or web address]  Job#: [id number]     Doc#: [id number]    CC:

## 2022-01-04 ENCOUNTER — OFFICE VISIT (OUTPATIENT)
Dept: ORTHOPEDIC SURGERY | Age: 67
End: 2022-01-04

## 2022-01-04 VITALS — BODY MASS INDEX: 30.1 KG/M2 | HEIGHT: 71 IN | WEIGHT: 215 LBS

## 2022-01-04 DIAGNOSIS — M23.203 DEGENERATIVE TEAR OF MEDIAL MENISCUS OF RIGHT KNEE: ICD-10-CM

## 2022-01-04 DIAGNOSIS — M17.11 PRIMARY OSTEOARTHRITIS OF RIGHT KNEE: ICD-10-CM

## 2022-01-04 DIAGNOSIS — M84.469G INSUFFICIENCY FRACTURE OF TIBIA WITH DELAYED HEALING, SUBSEQUENT ENCOUNTER: Primary | ICD-10-CM

## 2022-01-04 PROCEDURE — 99024 POSTOP FOLLOW-UP VISIT: CPT | Performed by: ORTHOPAEDIC SURGERY

## 2022-01-04 NOTE — PROGRESS NOTES
FOLLOW-UP VISIT      The patient returns for follow-up s/p right knee video arthroscopy with partial medial meniscectomy and subchondroplasty of the medial tibial plateau    Date of Surgery    12/27/2021    Wound Status    Sutures Removed, Incisions are healing well with no surrounding erythema, and minimal ecchymosis. Exam    He is doing well walking without amatory aids and happy with the result. I reviewed his arthroscopic pictures with him and discussed the subchondral plasty. He otherwise has no evidence of infection or DVT at this time and just minimal swelling    Plan    Slow return to normal activity    Follow-up Appointment    4 weeks.

## 2022-02-07 ENCOUNTER — OFFICE VISIT (OUTPATIENT)
Dept: ORTHOPEDIC SURGERY | Age: 67
End: 2022-02-07

## 2022-02-07 VITALS — BODY MASS INDEX: 30.78 KG/M2 | HEIGHT: 70 IN | WEIGHT: 215 LBS

## 2022-02-07 DIAGNOSIS — M23.203 DEGENERATIVE TEAR OF MEDIAL MENISCUS OF RIGHT KNEE: ICD-10-CM

## 2022-02-07 DIAGNOSIS — M84.469G INSUFFICIENCY FRACTURE OF TIBIA WITH DELAYED HEALING, SUBSEQUENT ENCOUNTER: Primary | ICD-10-CM

## 2022-02-07 DIAGNOSIS — M17.11 PRIMARY OSTEOARTHRITIS OF RIGHT KNEE: ICD-10-CM

## 2022-02-07 PROCEDURE — 99024 POSTOP FOLLOW-UP VISIT: CPT | Performed by: ORTHOPAEDIC SURGERY

## 2022-02-07 NOTE — PROGRESS NOTES
Returns today for evaluation. He continues to improve and this point I would recommend consideration of viscosupplementation versus cortisone. He probably can have cortisone month if that does not work consider Visco after that. He grades his pain 1/10 and is just sore in the middle migration but otherwise is happy with the result and becoming more active over time    He has no signs of infection DVT or effusion.

## 2022-02-23 ENCOUNTER — TELEPHONE (OUTPATIENT)
Dept: ORTHOPEDIC SURGERY | Age: 67
End: 2022-02-23

## 2022-02-23 DIAGNOSIS — M17.11 PRIMARY OSTEOARTHRITIS OF RIGHT KNEE: Primary | ICD-10-CM

## 2022-02-23 RX ORDER — CELECOXIB 200 MG/1
200 CAPSULE ORAL DAILY
Qty: 30 CAPSULE | Refills: 3 | Status: CANCELLED | OUTPATIENT
Start: 2022-02-23

## 2022-02-23 RX ORDER — CELECOXIB 200 MG/1
200 CAPSULE ORAL DAILY
Qty: 60 CAPSULE | Refills: 3 | Status: SHIPPED | OUTPATIENT
Start: 2022-02-23 | End: 2022-04-27 | Stop reason: SDUPTHER

## 2022-03-07 ENCOUNTER — OFFICE VISIT (OUTPATIENT)
Dept: ORTHOPEDIC SURGERY | Age: 67
End: 2022-03-07
Payer: MEDICARE

## 2022-03-07 VITALS — BODY MASS INDEX: 30.78 KG/M2 | WEIGHT: 215 LBS | HEIGHT: 70 IN

## 2022-03-07 DIAGNOSIS — M17.11 PRIMARY OSTEOARTHRITIS OF RIGHT KNEE: Primary | ICD-10-CM

## 2022-03-07 PROCEDURE — 99024 POSTOP FOLLOW-UP VISIT: CPT | Performed by: ORTHOPAEDIC SURGERY

## 2022-03-07 PROCEDURE — 20610 DRAIN/INJ JOINT/BURSA W/O US: CPT | Performed by: ORTHOPAEDIC SURGERY

## 2022-03-07 RX ORDER — BUPIVACAINE HYDROCHLORIDE 2.5 MG/ML
12 INJECTION, SOLUTION INFILTRATION; PERINEURAL ONCE
Status: COMPLETED | OUTPATIENT
Start: 2022-03-07 | End: 2022-03-07

## 2022-03-07 RX ORDER — METHYLPREDNISOLONE ACETATE 40 MG/ML
40 INJECTION, SUSPENSION INTRA-ARTICULAR; INTRALESIONAL; INTRAMUSCULAR; SOFT TISSUE ONCE
Status: COMPLETED | OUTPATIENT
Start: 2022-03-07 | End: 2022-03-07

## 2022-03-07 RX ADMIN — METHYLPREDNISOLONE ACETATE 40 MG: 40 INJECTION, SUSPENSION INTRA-ARTICULAR; INTRALESIONAL; INTRAMUSCULAR; SOFT TISSUE at 10:51

## 2022-03-07 RX ADMIN — BUPIVACAINE HYDROCHLORIDE 30 MG: 2.5 INJECTION, SOLUTION INFILTRATION; PERINEURAL at 10:52

## 2022-03-07 NOTE — PROGRESS NOTES
He returns today and says his pain is improved from a month ago but still 3-4 over 10 during certain activities. At this time I would recommend a cortisone injection today and request viscosupplementation for the near future. Recommendation is for a cortisone injection into the right knee. After informed consent was received from the patient, the right knee was injected with 1 mL(40mg)Depo-Medrol and 4 mL of 0.25% Marcaine  in the syringe from an anterolateral joint line approach, using a 25-gauge needle, under sterile Betadine prep, using ethyl chloride as a topical refrigerant, for a diagnosis of osteoarthritis. The patient appeared to tolerate it well. The patient should return here periodically as needed. Encounter Diagnosis   Name Primary?     Primary osteoarthritis of right knee Yes        Orders Placed This Encounter   Procedures    NE ARTHROCENTESIS ASPIR&/INJ MAJOR JT/BURSA W/O US    DUROLANE INJECTION (For Auth/Precert)     Standing Status:   Future     Standing Expiration Date:   3/7/2023

## 2022-03-16 ENCOUNTER — TELEPHONE (OUTPATIENT)
Dept: ORTHOPEDIC SURGERY | Age: 67
End: 2022-03-16

## 2022-03-16 NOTE — TELEPHONE ENCOUNTER
General Question     Subject: WIFE CALLED IN TO ASK QUESTIONS ABOUT A POSSIBLE KNEE REPLACEMENT FOR HER . SHE FEELS HE IS GETTING WORSE AND FEELS MORE SHOULD BE DONE WITH HIS CARE. Patient and /or Facility Request: Mendez Solano  Contact Number: CLAUDIA/WIFE [phone] SHE WOULD LIKE A CALL BACK TODAY SINCE SHE IS WORKING FROM HOME AND AND IS ABLE TO TALK.

## 2022-03-22 ENCOUNTER — OFFICE VISIT (OUTPATIENT)
Dept: ORTHOPEDIC SURGERY | Age: 67
End: 2022-03-22

## 2022-03-22 VITALS — WEIGHT: 215 LBS | BODY MASS INDEX: 30.85 KG/M2

## 2022-03-22 DIAGNOSIS — M17.11 PRIMARY OSTEOARTHRITIS OF RIGHT KNEE: Primary | ICD-10-CM

## 2022-03-22 PROCEDURE — 99024 POSTOP FOLLOW-UP VISIT: CPT | Performed by: ORTHOPAEDIC SURGERY

## 2022-03-22 RX ORDER — PREDNISONE 1 MG/1
TABLET ORAL
Qty: 55 TABLET | Refills: 0 | Status: SHIPPED | OUTPATIENT
Start: 2022-03-22 | End: 2022-09-06 | Stop reason: ALTCHOICE

## 2022-03-22 NOTE — PROGRESS NOTES
He returns today for evaluation. He is now not quite 3 months status post right knee video arthroscopy with partial mini meniscectomy and subchondroplasty medial tibial plateau. Generally have seem to be doing fairly well but he still left with pain that he says is increasing. Appears to be more arthritic in origin. I reviewed his arthroscopic pictures with him and showed him his medial meniscus tear and amount of arthritis he had in the medial compartment but showed him that he had a fairly pristine patellofemoral and lateral compartment. His ACL and PCL were intact. Because of this I thought that there would be a potential that he may be a candidate for unicompartmental arthroplasty. Because of this I told him I would discuss with him in detail what a total knee replacement would involve as far as perioperative management and risks. He has a good understanding of this. I also told him that I would like him to see Dr. Edith Cotton for second opinion to see if he feels he would be a better candidate for a uni versus a total knee replacement. If he feels that the total knee replacement will be best suited for him then he can return for that scheduling. The patient was counseled at length about the risks of quan Covid-19 during their perioperative period and any recovery window from their procedure. The patient was made aware that quan Covid-19  may worsen their prognosis for recovering from their procedure  and lend to a higher morbidity and/or mortality risk. All material risks, benefits, and reasonable alternatives including postponing the procedure were discussed. The patient does wish to proceed with the procedure at this time. INFORMED CONSENT NOTE        We discussed the risks, benefits, and alternatives to the proposed procedure, as well as the necessity of other members of the healthcare team participating in the procedure.  All questions were answered and the patient elected to proceed with the proposed procedure and signed the informed consent form.

## 2022-03-25 ENCOUNTER — OFFICE VISIT (OUTPATIENT)
Dept: ORTHOPEDIC SURGERY | Age: 67
End: 2022-03-25

## 2022-03-25 VITALS — HEIGHT: 71 IN | BODY MASS INDEX: 29.82 KG/M2 | WEIGHT: 213 LBS

## 2022-03-25 DIAGNOSIS — M17.11 PRIMARY OSTEOARTHRITIS OF RIGHT KNEE: Primary | ICD-10-CM

## 2022-03-25 PROCEDURE — 99024 POSTOP FOLLOW-UP VISIT: CPT | Performed by: ORTHOPAEDIC SURGERY

## 2022-03-25 NOTE — PROGRESS NOTES
ORTHOPAEDIC SURGERY INITIAL EVALUATION NOTE  Chief Complaint   Patient presents with    New Patient     R KNEE       HISTORY OF PRESENT ILLNESS:  71-year-old male presents for evaluation of right knee pain. He has had pain ongoing for several years. He had tried numerous conservative treatments including oral anti-inflammatories, intra-articular injections. He had transient relief with corticosteroid injection. He did undergo a series of 3 viscosupplementation injections as well. An MRI was obtained in November which demonstrated bony edema involving the medial compartment as well as severe medial compartment osteoarthritic changes. He underwent a subchondroplasty on 12/27/2021 with Dr. Mira Britt. He indicates that he got transient relief from this procedure as well. He believes that his pain is back to its usual level of pain as it was preprocedure. He rates his pain a 6 out of 10 in severity. His pain is primarily about the medial knee and does not radiate. He will occasionally get anterior knee pain. He indicates that prior to his surgery as his pain was more significant to the anterior knee. He denies lateral knee pain. He denies mechanical symptoms at this time. He denies sensations of instability.   He was referred for evaluation of possible partial knee replacement versus total.    Past Medical History:   Diagnosis Date    Arthritis     BPH (benign prostatic hyperplasia)     Clostridium difficile infection 5/18/16, 4/28/16    Diverticulitis     Hyperlipidemia     Hypertension     Reflux     Seasonal allergies        Current Outpatient Medications   Medication Sig Dispense Refill    predniSONE (DELTASONE) 5 MG tablet 10 the first day decrease by 1 each day until completed 55 tablet 0    celecoxib (CELEBREX) 200 MG capsule Take 1 capsule by mouth daily 60 capsule 3    Fluticasone Furoate (FLONASE SENSIMIST NA) by Nasal route      celecoxib (CELEBREX) 200 MG capsule Take 1 capsule by mouth daily 30 capsule 2    famotidine (PEPCID) 20 MG tablet Take 20 mg by mouth daily      Omega-3 Fatty Acids (FISH OIL) 1000 MG CAPS Take 1,000 mg by mouth daily      montelukast (SINGULAIR) 10 MG tablet Take 10 mg by mouth nightly       lisinopril (PRINIVIL;ZESTRIL) 20 MG tablet Take 20 mg by mouth daily      amLODIPine (NORVASC) 5 MG tablet Take 5 mg by mouth daily       MAGNESIUM PO Take by mouth nightly      simvastatin (ZOCOR) 20 MG tablet Take 20 mg by mouth nightly.  cetirizine (ZYRTEC) 10 MG tablet Take 10 mg by mouth daily.  multivitamin (THERAGRAN) per tablet Take 1 tablet by mouth daily.          Current Facility-Administered Medications   Medication Dose Route Frequency Provider Last Rate Last Admin    sodium hyaluronate (DUROLANE) injection PRSY 60 mg  60 mg Intra-artICUlar Once Radha Birmingham MD            Past Surgical History:   Procedure Laterality Date    APPENDECTOMY      COLONOSCOPY  2005    hx of polyps    COLONOSCOPY  06/28/2016    CYSTOSCOPY N/A 6/9/2021    CYSTOSCOPY, URETHRAL DILATATION performed by Windle Spurling, MD at [facility] ARTHROSCOPY Right 12/27/2021    RIGHT KNEE VIDEO ARTHROSCOPY MEDIAL MENISCECTOMY, INTERNAL FIXATION MEDIAL TIBIAL PLATEAU INSUFFICIENCY FRACTURE WITH BONE SUBSTITUTE performed by Radha Birmingham MD at [address] [place] [address] [facility] OR    THYROID LOBECTOMY  12/20/12    left and isthmusectomy       No Known Allergies    Family History   Problem Relation Age of Onset    Diabetes Mother     Heart Disease Father     Diabetes Sister        Social History     Socioeconomic History    Marital status:      Spouse name: Not on file    Number of children: Not on file    Years of education: Not on file    Highest education level: Not on file   Occupational History    Not on file   Tobacco Use    Smoking status: Never Smoker    Smokeless tobacco: Never Used   Substance and Sexual Activity    Alcohol use: No    Drug use: No    Sexual activity: Yes     Partners: Female   Other Topics Concern    Not on file   Social History Narrative    Not on file     Social Determinants of Health     Financial Resource Strain:     Difficulty of Paying Living Expenses: Not on file   Food Insecurity:     Worried About Running Out of Food in the Last Year: Not on file    Bandar of Food in the Last Year: Not on file   Transportation Needs:     Lack of Transportation (Medical): Not on file    Lack of Transportation (Non-Medical): Not on file   Physical Activity:     Days of Exercise per Week: Not on file    Minutes of Exercise per Session: Not on file   Stress:     Feeling of Stress : Not on file   Social Connections:     Frequency of Communication with Friends and Family: Not on file    Frequency of Social Gatherings with Friends and Family: Not on file    Attends Alevism Services: Not on file    Active Member of [address] [place] [address] [employer] or Organizations: Not on file    Attends Club or Organization Meetings: Not on file    Marital Status: Not on file   Intimate Partner Violence:     Fear of Current or Ex-Partner: Not on file    Emotionally Abused: Not on file    Physically Abused: Not on file    Sexually Abused: Not on file   Housing Stability:     Unable to Pay for Housing in the Last Year: Not on file    Number of [place] in the Last Year: Not on file    Unstable Housing in the Last Year: Not on file     Review of Systems: Please see today's intake form for complete review of systems. PHYSICAL EXAM  Gen: awake, alert, oriented  HEENT: atraumatic, normocephalic  Neck: supple  Resp: equal chest rise bilaterally, no audible wheezes, no accessory muscle use. CV: Lower extremities warm and well perfused. Abd: soft, nontender   Focused examination of the right knee: There are no cuts, open wounds, or abrasions. Arthroscopy incision sites are healing appropriately. There is no signs of dehiscence. No surrounding erythema.   No active drainage. There is a small effusion. Knee range of motion is full extension to 120 degrees of flexion without difficulty. There is no appreciable instability with varus or valgus stress at 0 30 degrees of flexion. There is negative anterior posterior drawer test.  Negative Lachman. There is no appreciable subpatellar crepitus. There is tenderness to palpation diffusely about the patella. There is tenderness along the medial joint line. No tenderness to palpation laterally. No pain with Branden exam.  Sensation is intact to light touch in deep peroneal, superficial peroneal, tibial, sural, and saphenous nerve distributions. Motor function is intact to EHL, FHL, tibialis anterior, and gastroc. There is brisk capillary refill to the toes and a strong palpable dorsalis pedis pulse. Compartments are soft and compressible. There is no calf tenderness and a negative Homans' sign. RADIOGRAPHIC EXAM:  X-rays of the right knee from May 2021 including AP, tunnel, and lateral x-rays of the right knee this demonstrates varus malalignment. There is medial joint space narrowing. On tunnel x-ray, there is bone against bone apposition. There is subchondral cystic formation. There is marginal osteophyte formation. There is relative preservation of the lateral compartment. There is early minor osteoarthritic changes involving the patellofemoral joint including the medial facet. MRI from November 2021 was also reviewed. This demonstrates varus malalignment. There is severe osteoarthritis involving the medial compartment with subchondral edema and cystic formation. ASSESSEMENT AND PLAN    79 y.o. male with the following orthopaedic surgery problems:    1. Right knee osteoarthritis    I had a discussion with the patient regarding conservative management of osteoarthritis of the knee. He has tried and failed almost all nonsurgical management.   He indicates that he has daily pain that is interfering with activities of daily living. He is now approximately 3 months status post subchondroplasty to the medial compartment without relief. He does have some minor symptomatology involving the lateral and patellofemoral compartments on exam today. I advised him that at age 79, I would recommend a total knee arthroplasty due to its more reliable result. I indicated to him that a partial knee replacement would come with the risk of having some persistent pain in the unresurfaced compartments. I discussed the risks, benefits, and alternatives to this. The recommendation is for total knee arthroplasty. He would need to wait at least 3 months after his most recent injection. I advised him that he is free to follow-up with Dr. El Tejeda if he prefers or with myself. He will call to schedule.     Bernard Huston MD

## 2022-03-25 NOTE — LETTER
[address] [place] [address]  [facility] [address]  Phone: [phone]  Fax: [phone]    Najma Quiñonez MD         March 25, 2022     Patient: Caleb Roa   YOB: 1955   Date of Visit: 3/25/2022       To Whom It May Concern: It is my medical opinion that Yair Agee requires a disability parking placard for the following reasons:  He cannot walk 200 feet without stopping to rest.  Duration of need: 6 months    If you have any questions or concerns, please don't hesitate to call.     Sincerely,      Najma Quiñonez MD  Orthopedic Surgery and Sports Medicine      Najma Quiñonez MD

## 2022-04-01 DIAGNOSIS — M17.11 PRIMARY OSTEOARTHRITIS OF RIGHT KNEE: Primary | ICD-10-CM

## 2022-04-27 DIAGNOSIS — M17.11 PRIMARY OSTEOARTHRITIS OF RIGHT KNEE: Primary | ICD-10-CM

## 2022-04-27 RX ORDER — CELECOXIB 200 MG/1
200 CAPSULE ORAL 2 TIMES DAILY
Qty: 60 CAPSULE | Refills: 3 | Status: SHIPPED | OUTPATIENT
Start: 2022-04-27 | End: 2022-08-22

## 2022-05-31 ENCOUNTER — TELEPHONE (OUTPATIENT)
Dept: ORTHOPEDIC SURGERY | Age: 67
End: 2022-05-31

## 2022-06-27 ENCOUNTER — OFFICE VISIT (OUTPATIENT)
Dept: ORTHOPEDIC SURGERY | Age: 67
End: 2022-06-27
Payer: MEDICARE

## 2022-06-27 DIAGNOSIS — M17.11 PRIMARY OSTEOARTHRITIS OF RIGHT KNEE: Primary | ICD-10-CM

## 2022-06-27 PROCEDURE — 99999 PR OFFICE/OUTPT VISIT,PROCEDURE ONLY: CPT | Performed by: ORTHOPAEDIC SURGERY

## 2022-06-27 PROCEDURE — 20610 DRAIN/INJ JOINT/BURSA W/O US: CPT | Performed by: ORTHOPAEDIC SURGERY

## 2022-06-27 RX ORDER — TRIAMCINOLONE ACETONIDE 40 MG/ML
40 INJECTION, SUSPENSION INTRA-ARTICULAR; INTRAMUSCULAR ONCE
Status: COMPLETED | OUTPATIENT
Start: 2022-06-27 | End: 2022-06-27

## 2022-06-27 RX ORDER — BUPIVACAINE HYDROCHLORIDE 2.5 MG/ML
7 INJECTION, SOLUTION INFILTRATION; PERINEURAL ONCE
Status: COMPLETED | OUTPATIENT
Start: 2022-06-27 | End: 2022-06-27

## 2022-06-27 RX ADMIN — BUPIVACAINE HYDROCHLORIDE 17.5 MG: 2.5 INJECTION, SOLUTION INFILTRATION; PERINEURAL at 09:19

## 2022-06-27 RX ADMIN — TRIAMCINOLONE ACETONIDE 40 MG: 40 INJECTION, SUSPENSION INTRA-ARTICULAR; INTRAMUSCULAR at 09:19

## 2022-06-27 NOTE — PROGRESS NOTES
He presents today requesting cortisone injections right knee for diagnosis of osteoarthritis. He says knee is doing better and decided to postpone indefinitely the recommended knee replacement since he actually improved more than he thought he was going to after the last procedure. Recommendation is for a cortisone injection into the right knee. After informed consent was received from the patient, the right knee was injected with 1 mL of Kenalog and 4 mL of 0.25% Marcaine  in the syringe from an anterolateral joint line approach, using a 25-gauge needle, under sterile Betadine prep, using ethyl chloride as a topical refrigerant, for a diagnosis of osteoarthritis. The patient appeared to tolerate it well. The patient should return here periodically as needed. Encounter Diagnosis   Name Primary?  Primary osteoarthritis of right knee Yes        No orders of the defined types were placed in this encounter.

## 2022-08-21 DIAGNOSIS — M17.11 PRIMARY OSTEOARTHRITIS OF RIGHT KNEE: ICD-10-CM

## 2022-08-22 RX ORDER — CELECOXIB 200 MG/1
CAPSULE ORAL
Qty: 60 CAPSULE | Refills: 3 | Status: SHIPPED | OUTPATIENT
Start: 2022-08-22

## 2022-08-24 ENCOUNTER — TELEPHONE (OUTPATIENT)
Dept: ORTHOPEDIC SURGERY | Age: 67
End: 2022-08-24

## 2022-08-24 NOTE — TELEPHONE ENCOUNTER
General Question     Subject: [address] [facility] A CORTISONE INJ  Patient and /or Facility Request: Nathalia Bauer  Contact Number: [phone]    PATIENT CALLED IN TO GET A CORTISONE INJECTION. Francis Conrad PATIENT MAKING SURE THAT WILL BE ABLE TO RECEIVE THE INJECTIONS AT THE TIME OF HIS VISIT. Francis Conrad AND WILL HE GET CHARGE FOR THE INJECTIONS. Francis Conrad PLEASE CALL BACK ATHE ABOVE NUMBER. ..

## 2022-09-06 ENCOUNTER — OFFICE VISIT (OUTPATIENT)
Dept: ORTHOPEDIC SURGERY | Age: 67
End: 2022-09-06
Payer: MEDICARE

## 2022-09-06 VITALS — BODY MASS INDEX: 29.82 KG/M2 | HEIGHT: 71 IN | WEIGHT: 213 LBS

## 2022-09-06 DIAGNOSIS — M17.11 PRIMARY OSTEOARTHRITIS OF RIGHT KNEE: Primary | ICD-10-CM

## 2022-09-06 PROCEDURE — 20610 DRAIN/INJ JOINT/BURSA W/O US: CPT | Performed by: ORTHOPAEDIC SURGERY

## 2022-09-06 RX ORDER — PREDNISONE 1 MG/1
TABLET ORAL
Qty: 55 TABLET | Refills: 1 | Status: SHIPPED | OUTPATIENT
Start: 2022-09-06

## 2022-09-06 RX ORDER — BUPIVACAINE HYDROCHLORIDE 2.5 MG/ML
7 INJECTION, SOLUTION INFILTRATION; PERINEURAL ONCE
Status: COMPLETED | OUTPATIENT
Start: 2022-09-06 | End: 2022-09-06

## 2022-09-06 RX ORDER — TRIAMCINOLONE ACETONIDE 40 MG/ML
40 INJECTION, SUSPENSION INTRA-ARTICULAR; INTRAMUSCULAR ONCE
Status: COMPLETED | OUTPATIENT
Start: 2022-09-06 | End: 2022-09-06

## 2022-09-06 RX ADMIN — BUPIVACAINE HYDROCHLORIDE 17.5 MG: 2.5 INJECTION, SOLUTION INFILTRATION; PERINEURAL at 08:53

## 2022-09-06 RX ADMIN — TRIAMCINOLONE ACETONIDE 40 MG: 40 INJECTION, SUSPENSION INTRA-ARTICULAR; INTRAMUSCULAR at 08:53

## 2022-09-06 NOTE — PROGRESS NOTES
He presents today with complaints of right greater than left knee pain. Is been 3 months since the injection. She has recurrence and does not recall any injuries. On exam to both knees he has good range of motion minimal crepitus no effusion some synovial thickening right worse than left with some mild laxity medially in the right knee although his range of motion is good. Impression: Bilateral knee right worse than left osteoarthritis    Recommendation: Right knee cortisone injection and give him some prednisone taper to take if he feels the need. Recommendation is for a cortisone injection into the right knee. After informed consent was received from the patient, the right knee was injected with 1 mL of 40mg/ml of Kenalog  and 4 mL of 0.25% Marcaine  in the syringe from an anterolateral joint line approach, using a 25-gauge needle, under sterile Betadine prep, using ethyl chloride as a topical refrigerant, for a diagnosis of osteoarthritis. The patient appeared to tolerate it well. The patient should return here periodically as needed. Encounter Diagnosis   Name Primary?     Localized osteoarthritis of left knee Yes         No orders of the defined types were placed in this encounter

## 2022-09-06 NOTE — LETTER
[address] [place] [address]  [facility] [address]  Phone: [phone]  Fax: [phone]    Alberto Angulo MD         September 6, 2022     Patient: Ethan Ma   YOB: 1955   Date of Visit: 9/6/2022       To Whom It May Concern: It is my medical opinion that Katalina Schofield requires a disability parking placard for the following reasons:  He cannot walk 200 feet without stopping to rest.  Duration of need: permanent    If you have any questions or concerns, please don't hesitate to call.     Sincerely,        Alberto Angulo MD

## 2022-09-07 ENCOUNTER — TELEPHONE (OUTPATIENT)
Dept: ORTHOPEDIC SURGERY | Age: 67
End: 2022-09-07

## 2022-09-07 NOTE — TELEPHONE ENCOUNTER
R/C TO PATIENT - PATIENT HAVING HISTAMINE REACTION AND ADVISED TO USE BENEDRYL. NO FEVER, JUST FLUSHED.  /69 AT HIGHEST

## 2022-09-07 NOTE — TELEPHONE ENCOUNTER
Other    Subject: Rt knee cortisone inj yesterday  Patient and /or Facility Request: K[facility] Number: [phone]    Patient's wife Ajay King  is req a call as soon as possible. Patient did get cortione inj rt knee yesterday and woke up with fever this morning. Also stated BP is elevated as well. Please return call to Nicki Allan at the above number.

## 2023-01-04 ENCOUNTER — TELEPHONE (OUTPATIENT)
Dept: ORTHOPEDIC SURGERY | Age: 68
End: 2023-01-04

## 2023-01-04 DIAGNOSIS — M17.11 PRIMARY OSTEOARTHRITIS OF RIGHT KNEE: ICD-10-CM

## 2023-01-04 RX ORDER — CELECOXIB 200 MG/1
CAPSULE ORAL
Qty: 60 CAPSULE | Refills: 3 | Status: CANCELLED | OUTPATIENT
Start: 2023-01-04

## 2023-01-04 RX ORDER — CELECOXIB 200 MG/1
CAPSULE ORAL
Qty: 60 CAPSULE | Refills: 3 | Status: SHIPPED | OUTPATIENT
Start: 2023-01-04

## 2023-01-04 NOTE — TELEPHONE ENCOUNTER
Prescription Refill     Medication Name:  celecoxib (CELEBREX) 200 MG capsule      Pharmacy: [facility] [id number] [facility], [address] [place] [address] [phone] [facility] [phone]    Patient Contact Number:  [phone]    Pt ASKING FOR REFILL ON THE ABOVE MED. IS THIS A MED THAT DR VAUGHAN WILL REFILL FOR THE Pt? PHARMACY HAS BEEN SENDING REQUESTS WITH NO RESPONSE. PLEASE ADVISE IF THE Pt IS ABLE TO GET REFILL OR IF APPT IS NECESSARY FOR REFILL @ THE # ABOVE.

## 2023-04-30 DIAGNOSIS — M17.11 PRIMARY OSTEOARTHRITIS OF RIGHT KNEE: ICD-10-CM

## 2023-05-01 RX ORDER — CELECOXIB 200 MG/1
CAPSULE ORAL
Qty: 60 CAPSULE | Refills: 3 | Status: SHIPPED | OUTPATIENT
Start: 2023-05-01

## 2023-09-02 DIAGNOSIS — M17.11 PRIMARY OSTEOARTHRITIS OF RIGHT KNEE: ICD-10-CM

## 2023-09-05 RX ORDER — CELECOXIB 200 MG/1
CAPSULE ORAL
Qty: 60 CAPSULE | Refills: 3 | Status: SHIPPED | OUTPATIENT
Start: 2023-09-05

## 2024-01-01 DIAGNOSIS — M17.11 PRIMARY OSTEOARTHRITIS OF RIGHT KNEE: ICD-10-CM

## 2024-01-10 DIAGNOSIS — M17.11 PRIMARY OSTEOARTHRITIS OF RIGHT KNEE: ICD-10-CM

## 2024-01-10 RX ORDER — CELECOXIB 200 MG/1
CAPSULE ORAL
Qty: 60 CAPSULE | Refills: 3 | Status: SHIPPED | OUTPATIENT
Start: 2024-01-10

## 2024-04-10 ENCOUNTER — TELEPHONE (OUTPATIENT)
Dept: ORTHOPEDIC SURGERY | Age: 69
End: 2024-04-10

## 2024-05-06 DIAGNOSIS — M17.11 PRIMARY OSTEOARTHRITIS OF RIGHT KNEE: ICD-10-CM

## 2024-05-06 RX ORDER — CELECOXIB 200 MG/1
CAPSULE ORAL
Qty: 60 CAPSULE | Refills: 3 | OUTPATIENT
Start: 2024-05-06

## 2024-05-13 DIAGNOSIS — M17.11 PRIMARY OSTEOARTHRITIS OF RIGHT KNEE: ICD-10-CM

## 2024-05-13 RX ORDER — CELECOXIB 200 MG/1
CAPSULE ORAL
Qty: 60 CAPSULE | Refills: 3 | OUTPATIENT
Start: 2024-05-13

## 2024-05-14 DIAGNOSIS — M17.11 PRIMARY OSTEOARTHRITIS OF RIGHT KNEE: ICD-10-CM

## 2024-05-14 RX ORDER — CELECOXIB 200 MG/1
CAPSULE ORAL
Qty: 60 CAPSULE | Refills: 3 | Status: SHIPPED | OUTPATIENT
Start: 2024-05-14

## 2024-05-28 ENCOUNTER — TELEPHONE (OUTPATIENT)
Dept: ORTHOPEDIC SURGERY | Age: 69
End: 2024-05-28

## 2024-09-09 DIAGNOSIS — M17.11 PRIMARY OSTEOARTHRITIS OF RIGHT KNEE: ICD-10-CM

## 2024-09-10 RX ORDER — CELECOXIB 200 MG/1
CAPSULE ORAL
Qty: 60 CAPSULE | Refills: 3 | Status: SHIPPED | OUTPATIENT
Start: 2024-09-10

## 2025-01-09 DIAGNOSIS — M17.11 PRIMARY OSTEOARTHRITIS OF RIGHT KNEE: ICD-10-CM

## 2025-01-09 RX ORDER — CELECOXIB 200 MG/1
CAPSULE ORAL
Qty: 60 CAPSULE | Refills: 3 | OUTPATIENT
Start: 2025-01-09

## 2025-01-14 DIAGNOSIS — M17.11 PRIMARY OSTEOARTHRITIS OF RIGHT KNEE: ICD-10-CM

## 2025-01-17 ENCOUNTER — TELEPHONE (OUTPATIENT)
Dept: ORTHOPEDIC SURGERY | Age: 70
End: 2025-01-17

## 2025-01-17 RX ORDER — CELECOXIB 200 MG/1
CAPSULE ORAL
Qty: 60 CAPSULE | Refills: 3 | OUTPATIENT
Start: 2025-01-17

## 2025-01-17 NOTE — TELEPHONE ENCOUNTER
LET HIS WIFE KNOW THAT DR. ROBERTSON IS OUT AND TO CALL PCP TO SEE IF THEY WILL DO A ONE TIME COURTESY

## 2025-01-17 NOTE — TELEPHONE ENCOUNTER
Prescription Refill     Medication Name:  CELEBREX   Ph[facility]acy: [facility]   Patient Contact Number:  [phone]     PT STATES HE HAS A DAY OF MEDICATION LEFT.

## 2025-02-18 ENCOUNTER — TELEPHONE (OUTPATIENT)
Dept: ORTHOPEDIC SURGERY | Age: 70
End: 2025-02-18

## 2025-02-18 DIAGNOSIS — M17.11 PRIMARY OSTEOARTHRITIS OF RIGHT KNEE: ICD-10-CM

## 2025-02-18 RX ORDER — CELECOXIB 200 MG/1
CAPSULE ORAL
Qty: 60 CAPSULE | Refills: 3 | Status: SHIPPED | OUTPATIENT
Start: 2025-02-18

## 2025-02-18 NOTE — TELEPHONE ENCOUNTER
Prescription Refill     Medication Name:  CELEBREX   Pharmacy: [facility]  Patient Contact Number:  [phone]     PATIENT CALLED REQ A REFILL OF HIS CELEBREX MEDICATION AS HE ONLY HAS ENOUGH UNTIL TOMORROW MORNING    PLEASE CALL BACK THE ABOVE NUMBER

## 2025-05-19 ENCOUNTER — TELEPHONE (OUTPATIENT)
Dept: ORTHOPEDIC SURGERY | Age: 70
End: 2025-05-19

## 2025-05-19 NOTE — TELEPHONE ENCOUNTER
Prescription Refill     Medication Name:  CELEBREX 200mg  [id number] [address] [facility] [phone] - F [phone]   Patient Contact Number:  [phone]

## 2025-05-20 DIAGNOSIS — M17.11 PRIMARY OSTEOARTHRITIS OF RIGHT KNEE: ICD-10-CM

## 2025-05-20 RX ORDER — CELECOXIB 200 MG/1
CAPSULE ORAL
Qty: 60 CAPSULE | Refills: 3 | Status: SHIPPED | OUTPATIENT
Start: 2025-05-20

## 2025-07-01 NOTE — TELEPHONE ENCOUNTER
Prescription Refill     Medication Name:  [address]: Tabitha Lawton  Patient Contact Number:  [phone]
General

## 2025-07-19 DIAGNOSIS — M17.11 PRIMARY OSTEOARTHRITIS OF RIGHT KNEE: ICD-10-CM

## 2025-07-24 RX ORDER — CELECOXIB 200 MG/1
200 CAPSULE ORAL 2 TIMES DAILY
Qty: 60 CAPSULE | Refills: 3 | Status: SHIPPED | OUTPATIENT
Start: 2025-07-24

## (undated) DEVICE — GOWN,SIRUS,NONRNF,3XL,18/CS: Brand: MEDLINE

## (undated) DEVICE — PREP SOL PVP IODINE 4%  4 OZ/BTL

## (undated) DEVICE — Z DUP USE 2522782 SOLUTION IRRIG 1000ML STRL H2O PLAS CONTAINER UROMATIC

## (undated) DEVICE — GLOVE SURG SZ 85 L12IN FNGR THK94MIL STD WHT ISOLEX LTX

## (undated) DEVICE — DBD-PACK,CYSTOSCOPY,PK VI,AURORA: Brand: MEDLINE

## (undated) DEVICE — Z INACTIVE NO SUPPLIER SOLUTIONIRRIG 3000ML 0.9% SOD CHL FLX CONT [79720808] [HOSPIRA WORLDWIDE INC]

## (undated) DEVICE — SUTURE ETHLN SZ 4-0 L18IN NONABSORBABLE BLK L19MM PS-2 3/8 1667H

## (undated) DEVICE — CYSTO/BLADDER IRRIGATION SET, REGULATING CLAMP

## (undated) DEVICE — BAG URIN STRL FOR URO CTCH SYS

## (undated) DEVICE — GLOVE ORANGE PI 8   MSG9080

## (undated) DEVICE — 4.5 MM INCISOR PLUS STRAIGHT                                    BLADES, POWER/EP-1, VIOLET, PACKAGED                                    6 PER BOX, STERILE

## (undated) DEVICE — SOLUTION IV IRRIG 500ML 0.9% SODIUM CHL 2F7123

## (undated) DEVICE — BOWL MED L 32OZ PLAS W/ MOLD GRAD EZ OPN PEEL PCH

## (undated) DEVICE — PACK PROCEDURE SURG SURGERYARTHROSCOPY KNEE

## (undated) DEVICE — MEDI-VAC NON-CONDUCTIVE SUCTION TUBING: Brand: CARDINAL HEALTH

## (undated) DEVICE — CANNULA NSL 13FT TUBE AD ETCO2 DIV SAMP M

## (undated) DEVICE — GOWN SIRUS NONREIN LG W/TWL: Brand: MEDLINE INDUSTRIES, INC.

## (undated) DEVICE — GAUZE,SPONGE,4"X4",8PLY,STRL,LF,10/TRAY: Brand: MEDLINE

## (undated) DEVICE — MANIFOLD SURG NEPTUNE WST MGMT